# Patient Record
Sex: MALE | Race: WHITE | Employment: FULL TIME | URBAN - METROPOLITAN AREA
[De-identification: names, ages, dates, MRNs, and addresses within clinical notes are randomized per-mention and may not be internally consistent; named-entity substitution may affect disease eponyms.]

---

## 2017-04-27 RX ORDER — PANTOPRAZOLE SODIUM 40 MG/1
TABLET, DELAYED RELEASE ORAL
Qty: 90 TAB | Refills: 3 | Status: CANCELLED | OUTPATIENT
Start: 2017-04-27

## 2017-04-27 NOTE — TELEPHONE ENCOUNTER
Pt advised that he hasnt been seen since 2015.   Appt scheduled with Dr. Minda Chauhan on Monday 5-1-17 at 2:00pm.

## 2017-05-01 ENCOUNTER — OFFICE VISIT (OUTPATIENT)
Dept: FAMILY MEDICINE CLINIC | Age: 37
End: 2017-05-01

## 2017-05-01 VITALS
BODY MASS INDEX: 36.64 KG/M2 | DIASTOLIC BLOOD PRESSURE: 87 MMHG | OXYGEN SATURATION: 95 % | WEIGHT: 228 LBS | RESPIRATION RATE: 16 BRPM | TEMPERATURE: 98 F | HEART RATE: 102 BPM | SYSTOLIC BLOOD PRESSURE: 128 MMHG | HEIGHT: 66 IN

## 2017-05-01 DIAGNOSIS — Z23 ENCOUNTER FOR IMMUNIZATION: ICD-10-CM

## 2017-05-01 DIAGNOSIS — Z00.00 WELL ADULT EXAM: Primary | ICD-10-CM

## 2017-05-01 DIAGNOSIS — K21.00 GERD WITH ESOPHAGITIS: ICD-10-CM

## 2017-05-01 RX ORDER — PANTOPRAZOLE SODIUM 40 MG/1
40 TABLET, DELAYED RELEASE ORAL DAILY
Qty: 90 TAB | Refills: 3 | Status: SHIPPED | OUTPATIENT
Start: 2017-05-01 | End: 2018-05-04 | Stop reason: SDUPTHER

## 2017-05-01 RX ORDER — PANTOPRAZOLE SODIUM 40 MG/1
40 TABLET, DELAYED RELEASE ORAL DAILY
Qty: 90 TAB | Refills: 3 | Status: CANCELLED | OUTPATIENT
Start: 2017-05-01

## 2017-05-01 NOTE — PROGRESS NOTES
HPI  Donaldo Arrieta is a 43 y.o. male who presents to establish care with me. Last seen in 2015. He has no medical complaints other than a history of esophagitis diagnosed on EGD in 2014. Takes chronic PPI for this. Needs a refill on this today    Has gained some weight recently after he had a baby. Has not been going to the gym quite as much and fell off of his diet. He is working on calorie counting and try to get back into the gym. PMHx:  Past Medical History:   Diagnosis Date    GERD (gastroesophageal reflux disease)     Hypertension        Meds:   Current Outpatient Prescriptions   Medication Sig Dispense Refill    pantoprazole (PROTONIX) 40 mg tablet Take 1 Tab by mouth daily. 90 Tab 3       Allergies:   No Known Allergies    Smoker:  History   Smoking Status    Never Smoker   Smokeless Tobacco    Never Used       ETOH:   History   Alcohol use Not on file       FH: No family history on file. ROS:  As listed in HPI. In addition:  Constitutional:   No headache, fever, fatigue, weight loss or weight gain      Eyes:   No redness, pruritis, pain, visual changes, swelling, or discharge      Ears:    No pain, loss or changes in hearing     Cardiac:    No chest pain      Resp:   No cough or shortness of breath      Neuro   No loss of consciousness, dizziness, seizures      Physical Exam:  Blood pressure 128/87, pulse (!) 102, temperature 98 °F (36.7 °C), resp. rate 16, height 5' 6\" (1.676 m), weight 228 lb (103.4 kg), SpO2 95 %. GEN: No apparent distress. Alert and oriented and responds to all questions appropriately. EYES:  Conjunctiva clear; pupils round and reactive to light; extraocular movements are intact. EAR: External ears are normal.  Tympanic membranes are clear and without effusion. NOSE: Turbinates are within normal limits. No drainage  OROPHYARYNX: No oral lesions or exudates.   NECK:  Supple; no masses; thyroid normal           LUNGS: Respirations unlabored; clear to auscultation bilaterally  CARDIOVASCULAR: Regular rate and rhythm without murmurs   ABDOMEN: Soft; nontender; nondistended; normoactive bowel sounds; no masses or organomegaly  NEUROLOGIC:  No focal neurologic deficits. Strength and sensation grossly intact. Coordination and gait grossly intact. EXT: Well perfused. No edema. SKIN: No obvious rashes. Assessment and Plan     Well adult  Surveillance labs    GERD with esophagitis  Protonix    Tdap    Recommend annual follow-up, every 3 years at the most    Addendum: Labs reviewed. LDL 5 years ago was 200 and is 175 this year. Should probably consider statin    Prediabetes, A1c 5.8. Recommend six-month follow-up. Notify patient      ICD-10-CM ICD-9-CM    1. Well adult exam D56.31 C83.8 METABOLIC PANEL, COMPREHENSIVE      CBC WITH AUTOMATED DIFF      LIPID PANEL      HEMOGLOBIN A1C WITH EAG   2. GERD with esophagitis K21.0 530.11 pantoprazole (PROTONIX) 40 mg tablet   3. Encounter for immunization Z23 V03.89 TETANUS, DIPHTHERIA TOXOIDS AND ACELLULAR PERTUSSIS VACCINE (TDAP), IN INDIVIDS. >=7, IM      OK IMMUNIZ ADMIN,1 SINGLE/COMB VAC/TOXOID       AVS given.  Pt expressed understanding of instructions

## 2017-05-01 NOTE — TELEPHONE ENCOUNTER
Script for pantoprazole 40mg sent to [facility] pharmacy today per Dr. Yina Schmidt.

## 2017-05-01 NOTE — PATIENT INSTRUCTIONS

## 2017-05-01 NOTE — MR AVS SNAPSHOT
Visit Information Date & Time Provider Department Dept. Phone Encounter #  
 5/1/2017  2:00 PM Bhumi Alvarez MD [address] [facility] [phone] [id number] Upcoming Health Maintenance Date Due DTaP/Tdap/Td series (1 - Tdap) 8/4/1999 INFLUENZA AGE 9 TO ADULT 8/1/2017 Allergies as of 5/1/2017  Review Complete On: 5/1/2017 By: Renuka Cam No Known Allergies Current Immunizations  Never Reviewed Name Date Tdap  Incomplete Not reviewed this visit You Were Diagnosed With   
  
 Codes Comments Well adult exam    -  Primary ICD-10-CM: Z00.00 ICD-9-CM: V70.0 GERD with esophagitis     ICD-10-CM: K21.0 ICD-9-CM: 530.11 Encounter for immunization     ICD-10-CM: J29 ICD-9-CM: V03.89 Vitals BP Pulse Temp Resp Height(growth percentile) Weight(growth percentile) (!) 140/95 (BP 1 Location: Right arm, BP Patient Position: Sitting) (!) 102 98 °F (36.7 °C) 16 5' 6\" (1.676 m) 228 lb (103.4 kg) SpO2 BMI Smoking Status 95% 36.8 kg/m2 Never Smoker Vitals History BMI and BSA Data Body Mass Index Body Surface Area  
 36.8 kg/m 2 2.19 m 2 Preferred Pharmacy Pharmacy Name Phone [facility] [address] [place] [address] [phone] Your Updated Medication List  
  
   
This list is accurate as of: 5/1/17  2:36 PM.  Always use your most recent med list.  
  
  
  
  
 pantoprazole 40 mg tablet Commonly known as:  PROTONIX Take 1 Tab by mouth daily. Prescriptions Sent to Pharmacy Refills  
 pantoprazole (PROTONIX) 40 mg tablet 3 Sig: Take 1 Tab by mouth daily. Class: Normal  
 Pharmacy: [facility] Ph #: [phone] Route: Oral  
  
We Performed the Following CBC WITH AUTOMATED DIFF [96313 CPT(R)] HEMOGLOBIN A1C WITH EAG [79092 CPT(R)] LIPID PANEL [52939 CPT(R)] METABOLIC PANEL, COMPREHENSIVE [79712 CPT(R)] AK IMMUNIZ ADMIN,1 SINGLE/COMB VAC/TOXOID [id number] CPT(R)] TETANUS, DIPHTHERIA TOXOIDS AND ACELLULAR PERTUSSIS VACCINE (TDAP), IN INDIVIDS. >=7, IM [id number] CPT(R)] Patient Instructions Learning About Physical Activity What is physical activity? Physical activity is any kind of activity that gets your body moving. The types of physical activity that can help you get fit and stay healthy include: · Aerobic or \"cardio\" activities that make your heart beat faster and make you breathe harder, such as brisk walking, riding a bike, or running. Aerobic activities strengthen your heart and lungs and build up your endurance. · Strength training activities that make your muscles work against, or \"resist,\" something, such as lifting weights or doing push-ups. These activities help tone and strengthen your muscles. · Stretches that allow you to move your joints and muscles through their full range of motion. Stretching helps you be more flexible and avoid injury. What are the benefits of physical activity? Being active is one of the best things you can do to get fit and stay healthy. It helps you to: · Feel stronger and have more energy to do all the things you like to do. · Focus better at school or work and perform better in sports. · Feel, think, and sleep better. · Reach and stay at a healthy weight. · Lose fat and build lean muscle. · Lower your risk for serious health problems. · Keep your bones, muscles, and joints strong. Being fit lets you do more physical activity. And it lets you work out harder without as much effort. How can you make physical activity part of your life? Get at least 30 minutes of exercise on most days of the week. Walking is a good choice. You also may want to do other activities, such as running, swimming, cycling, or playing tennis or team sports. Pick activities that you likeones that make your heart beat faster, your muscles stronger, and your muscles and joints more flexible. If you find more than one thing you like doing, do them all. You don't have to do the same thing every day. Get your heart pumping every day. Any activity that makes your heart beat faster and keeps it at that rate for a while counts. Here are some great ways to get your heart beating faster: · Go for a brisk walk, run, or bike ride. · Go for a hike or swim. · Go in-line skating. · Play a game of touch football, basketball, or soccer. · Ride a bike. · Play tennis or racquetball. · Climb stairs. Even some household chores can be aerobicjust do them at a faster pace. Vacuuming, raking or mowing the lawn, sweeping the garage, and washing and waxing the car all can help get your heart rate up. Strengthen your muscles during the week. You don't have to lift heavy weights or grow big, bulky muscles to get stronger. Doing a few simple activities that make your muscles work against, or \"resist,\" something can help you get stronger. For example, you can: · Do push-ups or sit-ups, which use your own body weight as resistance. · Lift weights or dumbbells or use stretch bands at home or in a gym or community center. Stretch your muscles often. Stretching will help you as you become more active. It can help you stay flexible, loosen tight muscles, and avoid injury. It can also help improve your balance and posture and can be a great way to relax. Be sure to stretch the muscles you'll be using when you work out. It's best to warm your muscles slightly before you stretch them. Walk or do some other light aerobic activity for a few minutes, and then start stretching. When you stretch your muscles: · Do it slowly. Stretching is not about going fast or making sudden movements. · Don't push or bounce during a stretch. · Hold each stretch for at least 15 to 30 seconds, if you can. You should feel a stretch in the muscle, but not pain. · Breathe out as you do the stretch. Then breathe in as you hold the stretch. Don't hold your breath. If you're worried about how more activity might affect your health, have a checkup before you start. Follow any special advice your doctor gives you for getting a smart start. Where can you learn more? Go to http://gregoria-linda.info/. Enter F937 in the search box to learn more about \"Learning About Physical Activity. \" Current as of: May 27, 2016 Content Version: 11.2 © 5789-8218 [employer]. Care instructions adapted under license by [employer] (which disclaims liability or warranty for this information). If you have questions about a medical condition or this instruction, always ask your healthcare professional. [address] any warranty or liability for your use of this information. Introducing [facility]! Toribio Nyhan introduces [employer] patient portal. Now you can access parts of your medical record, email your doctor's office, and request medication refills online. 1. In your internet browser, go to [email or web address] 2. Click on the First Time User? Click Here link in the Sign In box. You will see the New Member Sign Up page. 3. Enter your [employer] Access Code exactly as it appears below. You will not need to use this code after youve completed the sign-up process. If you do not sign up before the expiration date, you must request a new code. · [employer] Access Code: JENNIFFER HORTON [facility] Expires: 7/30/2017  2:08 PM 
 
4. Enter the last four digits of your Social Security Number (xxxx) and Date of Birth (mm/dd/yyyy) as indicated and click Submit. You will be taken to the next sign-up page. 5. Create a KeyEffx ID.  This will be your [employer] login ID and cannot be changed, so think of one that is secure and easy to remember. 6. Create a [employer] password. You can change your password at any time. 7. Enter your Password Reset Question and Answer. This can be used at a later time if you forget your password. 8. Enter your e-mail address. You will receive e-mail notification when new information is available in [address] 9. Click Sign Up. You can now view and download portions of your medical record. 10. Click the Download Summary menu link to download a portable copy of your medical information. If you have questions, please visit the Frequently Asked Questions section of the [employer] website. Remember, [employer] is NOT to be used for urgent needs. For medical emergencies, dial 911. Now available from your iPhone and Android! Please provide this summary of care documentation to your next provider. Your primary care clinician is listed as [address] If you have any questions after today's visit, please call [phone].

## 2017-05-02 ENCOUNTER — TELEPHONE (OUTPATIENT)
Dept: FAMILY MEDICINE CLINIC | Age: 37
End: 2017-05-02

## 2017-05-02 PROBLEM — E78.00 PURE HYPERCHOLESTEROLEMIA: Status: ACTIVE | Noted: 2017-05-02

## 2017-05-02 PROBLEM — R73.03 PREDIABETES: Status: ACTIVE | Noted: 2017-05-02

## 2017-05-02 LAB
ALBUMIN SERPL-MCNC: 4.5 G/DL (ref 3.5–5.5)
ALBUMIN/GLOB SERPL: 1.6 {RATIO} (ref 1.2–2.2)
ALP SERPL-CCNC: 69 IU/L (ref 39–117)
ALT SERPL-CCNC: 39 IU/L (ref 0–44)
AST SERPL-CCNC: 23 IU/L (ref 0–40)
BASOPHILS # BLD AUTO: 0 X10E3/UL (ref 0–0.2)
BASOPHILS NFR BLD AUTO: 0 %
BILIRUB SERPL-MCNC: 0.3 MG/DL (ref 0–1.2)
BUN SERPL-MCNC: 11 MG/DL (ref 6–20)
BUN/CREAT SERPL: 12 (ref 9–20)
CALCIUM SERPL-MCNC: 9.6 MG/DL (ref 8.7–10.2)
CHLORIDE SERPL-SCNC: 100 MMOL/L (ref 96–106)
CHOLEST SERPL-MCNC: 270 MG/DL (ref 100–199)
CO2 SERPL-SCNC: 22 MMOL/L (ref 18–29)
CREAT SERPL-MCNC: 0.93 MG/DL (ref 0.76–1.27)
EOSINOPHIL # BLD AUTO: 0.1 X10E3/UL (ref 0–0.4)
EOSINOPHIL NFR BLD AUTO: 2 %
ERYTHROCYTE [DISTWIDTH] IN BLOOD BY AUTOMATED COUNT: 13.6 % (ref 12.3–15.4)
EST. AVERAGE GLUCOSE BLD GHB EST-MCNC: 120 MG/DL
GLOBULIN SER CALC-MCNC: 2.8 G/DL (ref 1.5–4.5)
GLUCOSE SERPL-MCNC: 179 MG/DL (ref 65–99)
HBA1C MFR BLD: 5.8 % (ref 4.8–5.6)
HCT VFR BLD AUTO: 43.2 % (ref 37.5–51)
HDLC SERPL-MCNC: 50 MG/DL
HGB BLD-MCNC: 14.6 G/DL (ref 12.6–17.7)
IMM GRANULOCYTES # BLD: 0 X10E3/UL (ref 0–0.1)
IMM GRANULOCYTES NFR BLD: 0 %
INTERPRETATION, 910389: NORMAL
LDLC SERPL CALC-MCNC: 173 MG/DL (ref 0–99)
LYMPHOCYTES # BLD AUTO: 2 X10E3/UL (ref 0.7–3.1)
LYMPHOCYTES NFR BLD AUTO: 34 %
MCH RBC QN AUTO: 30.5 PG (ref 26.6–33)
MCHC RBC AUTO-ENTMCNC: 33.8 G/DL (ref 31.5–35.7)
MCV RBC AUTO: 90 FL (ref 79–97)
MONOCYTES # BLD AUTO: 0.4 X10E3/UL (ref 0.1–0.9)
MONOCYTES NFR BLD AUTO: 6 %
NEUTROPHILS # BLD AUTO: 3.6 X10E3/UL (ref 1.4–7)
NEUTROPHILS NFR BLD AUTO: 58 %
PLATELET # BLD AUTO: 271 X10E3/UL (ref 150–379)
POTASSIUM SERPL-SCNC: 3.7 MMOL/L (ref 3.5–5.2)
PROT SERPL-MCNC: 7.3 G/DL (ref 6–8.5)
RBC # BLD AUTO: 4.78 X10E6/UL (ref 4.14–5.8)
SODIUM SERPL-SCNC: 142 MMOL/L (ref 134–144)
TRIGL SERPL-MCNC: 234 MG/DL (ref 0–149)
VLDLC SERPL CALC-MCNC: 47 MG/DL (ref 5–40)
WBC # BLD AUTO: 6.1 X10E3/UL (ref 3.4–10.8)

## 2017-05-02 NOTE — TELEPHONE ENCOUNTER
Labs reviewed, 2 things: Your cholesterol is high. You are pretty young but we may need to consider starting a cholesterol medicine. Your hemoglobin A1c is high. You are \"pre-diabetic. \" You do not have diabetes but you will develop diabetes in the next few years if you do not make some changes. Change your diet, focus on vegetables and lean meats. Avoid pasta, white bread and potatoes. Do not drink soda. We can refer you to a dietician if you would like to talk with someone about your diet. Make these changes and come back in 6 months to recheck your blood sugar to make sure you are going in the right direction.

## 2017-05-03 NOTE — TELEPHONE ENCOUNTER
Mr. Meg Nunez notified Dr. Ramon Yin said Labs reviewed, 2 things: Your cholesterol is high. You are pretty young but we may need to consider starting a cholesterol medicine.     Your hemoglobin A1c is high. You are \"pre-diabetic. \" You do not have diabetes but you will develop diabetes in the next few years if you do not make some changes. Change your diet, focus on vegetables and lean meats. Avoid pasta, white bread and potatoes. Do not drink soda. We can refer you to a dietician if you would like to talk with someone about your diet. Make these changes and come back in 6 months to recheck your blood sugar to make sure you are going in the right direction.   he voiced understanding and wants to wait on cholesterol med and work on diet.  Apt set up for 8/17

## 2018-05-04 DIAGNOSIS — K21.00 GERD WITH ESOPHAGITIS: ICD-10-CM

## 2018-05-04 RX ORDER — PANTOPRAZOLE SODIUM 40 MG/1
40 TABLET, DELAYED RELEASE ORAL DAILY
Qty: 90 TAB | Refills: 3 | Status: SHIPPED | OUTPATIENT
Start: 2018-05-04 | End: 2019-04-16 | Stop reason: SDUPTHER

## 2018-05-04 NOTE — TELEPHONE ENCOUNTER
Pt is calling requesting a refill on his med [facility] is the new pharmacy and I changed to it    Requested Prescriptions     Pending Prescriptions Disp Refills    pantoprazole (PROTONIX) 40 mg tablet 90 Tab 3     Sig: Take 1 Tab by mouth daily.

## 2019-04-16 DIAGNOSIS — K21.00 GERD WITH ESOPHAGITIS: ICD-10-CM

## 2019-04-16 RX ORDER — PANTOPRAZOLE SODIUM 40 MG/1
TABLET, DELAYED RELEASE ORAL
Qty: 90 TAB | Refills: 3 | Status: SHIPPED | OUTPATIENT
Start: 2019-04-16 | End: 2020-04-07 | Stop reason: SDUPTHER

## 2020-04-06 DIAGNOSIS — K21.00 GERD WITH ESOPHAGITIS: ICD-10-CM

## 2020-04-06 RX ORDER — PANTOPRAZOLE SODIUM 40 MG/1
TABLET, DELAYED RELEASE ORAL
Qty: 30 TAB | Refills: 1 | OUTPATIENT
Start: 2020-04-06

## 2020-04-06 NOTE — TELEPHONE ENCOUNTER
Last seen 5/2017. Can refill a limited supply but needs follow-up before further refills.   Virtual visit would be fine

## 2020-04-07 ENCOUNTER — VIRTUAL VISIT (OUTPATIENT)
Dept: FAMILY MEDICINE CLINIC | Age: 40
End: 2020-04-07

## 2020-04-07 VITALS — BODY MASS INDEX: 38.57 KG/M2 | HEIGHT: 66 IN | WEIGHT: 240 LBS

## 2020-04-07 DIAGNOSIS — E66.09 CLASS 2 OBESITY DUE TO EXCESS CALORIES WITHOUT SERIOUS COMORBIDITY WITH BODY MASS INDEX (BMI) OF 38.0 TO 38.9 IN ADULT: ICD-10-CM

## 2020-04-07 DIAGNOSIS — E78.00 PURE HYPERCHOLESTEROLEMIA: ICD-10-CM

## 2020-04-07 DIAGNOSIS — R73.03 PREDIABETES: Primary | ICD-10-CM

## 2020-04-07 DIAGNOSIS — K21.00 GERD WITH ESOPHAGITIS: ICD-10-CM

## 2020-04-07 RX ORDER — PANTOPRAZOLE SODIUM 40 MG/1
TABLET, DELAYED RELEASE ORAL
Qty: 90 TAB | Refills: 3 | Status: SHIPPED | OUTPATIENT
Start: 2020-04-07 | End: 2021-04-05

## 2020-04-07 NOTE — PROGRESS NOTES
Chief Complaint   Patient presents with    Medication Refill     follow-up     Health Maintenance reviewed     1. Have you been to the ER, urgent care clinic since your last visit? Hospitalized since your last visit? no    2. Have you seen or consulted any other health care providers outside of the [address] [place] [address] since your last visit? Include any pap smears or colon screening.   No

## 2020-04-07 NOTE — PROGRESS NOTES
HPI  Jennifer Lowe is a 37 y.o. male who presents to follow-up on chronic medical issues. No complaints other than esophagitis diagnosed on EGD 2014. Takes chronic PPI for this, needs a refill today. He has gained 20 pounds since I last saw him 3 years ago. Attributes this to a job change, doing a lot of travel and eating on the road. He has been trying to go to the gym and is thinking about getting a bike to drive around the neighborhood. When last seen he was found to have an LDL of 175 down from 200. Also found to have prediabetes A1c 5.8. We had called him with those results. He wanted to wait on cholesterol medicine. I had recommended 6-month follow-up, he missed that appointment. Reviewed those labs with him today. He did not recall these being a problem. He has a family history of early heart disease. Dad had a \"minor heart attack\" in his 45s. Mother also has heart disease but he is not sure about the age    PMHx:  Past Medical History:   Diagnosis Date    GERD (gastroesophageal reflux disease)     Hypertension        Meds:   Current Outpatient Medications   Medication Sig Dispense Refill    pantoprazole (PROTONIX) 40 mg tablet TAKE 1 TABLET BY MOUTH EVERY DAY 90 Tab 3       Allergies:   No Known Allergies    Smoker:  Social History     Tobacco Use   Smoking Status Never Smoker   Smokeless Tobacco Never Used       ETOH:   Social History     Substance and Sexual Activity   Alcohol Use None       FH: No family history on file. ROS:   As listed in HPI. In addition:  Constitutional:   No headache, fever, fatigue, weight loss or weight gain      Cardiac:    No chest pain      Resp:   No cough or shortness of breath      Neuro   No loss of consciousness, dizziness, seizures      Physical Exam:  Height 5' 6\" (1.676 m), weight 240 lb (108.9 kg). GEN: No apparent distress. Alert and oriented and responds to all questions appropriately. NEUROLOGIC:  No focal neurologic deficits.  Strength and sensation grossly intact. Coordination and gait grossly intact. EXT: Well perfused. No edema. SKIN: No obvious rashes. Assessment and Plan     GERD with esophagitis  Refill Protonix    Hyperlipidemia, possible familial hyperlipidemia  Father history early heart disease in his 45s  LDL was 175 down from 200  Offered statin to reduce risk of heart disease. Discussed wisdom of doing this sooner rather than later to protect body over time  He would like to wait on statin until blood work done in future    Prediabetes  A1c was 5.8 when last checked 3 years ago  Has gained 20 pounds since then  Several starches in his diet that he has been thinking about cutting back on    Gout? Was having flares in his ankles once every few years. Had 2 flares in the last year. No particular dietary issue he is noticed. Thinks dehydration from working in the yard may be contributing  Recalls being on indomethacin but did not feel it was too useful. Flares last about 3 days and he does not feel the need for medicine. Obese  20 pound weight gain in the last few years  Eating out. Briefly counseled on diet. Recommended focusing on cutting down on carbs    Offered labs to be sent to [facility], he would like to follow-up when pandemic has  down and do blood work at that time    Plan for CBC, CMP, lipid, TSH, A1c, uric acid        ICD-10-CM ICD-9-CM    1. Prediabetes R73.03 790.29    2. GERD with esophagitis K21.0 530.11 pantoprazole (PROTONIX) 40 mg tablet   3. Pure hypercholesterolemia E78.00 272.0    4. Class 2 obesity due to excess calories without serious comorbidity with body mass index (BMI) of 38.0 to 38.9 in adult E66.09 278.00     Z68.38 V85.38        AVS given. Pt expressed understanding of instructions    THIS VISIT WAS COMPLETED VIRTUALLY USING DOXY. ME    Due to this being a TeleHealth evaluation, many elements of the physical examination are unable to be assessed.      Pursuant to the emergency declaration under the [facility], [facility] waiver authority and the [employer] and [employer] Act, this Virtual  Visit was conducted, with patient's consent, to reduce the patient's risk of exposure to COVID-19 and provide continuity of care for an established patient. Services were provided through a video synchronous discussion virtually to substitute for in-person clinic visit.

## 2021-08-30 ENCOUNTER — APPOINTMENT (OUTPATIENT)
Dept: CT IMAGING | Age: 41
DRG: 149 | End: 2021-08-30
Attending: EMERGENCY MEDICINE
Payer: COMMERCIAL

## 2021-08-30 ENCOUNTER — HOSPITAL ENCOUNTER (INPATIENT)
Age: 41
LOS: 2 days | Discharge: HOME | DRG: 149 | End: 2021-09-01
Attending: EMERGENCY MEDICINE | Admitting: INTERNAL MEDICINE
Payer: COMMERCIAL

## 2021-08-30 DIAGNOSIS — R03.0 ELEVATED BLOOD PRESSURE READING: ICD-10-CM

## 2021-08-30 DIAGNOSIS — R42 VERTIGO: ICD-10-CM

## 2021-08-30 DIAGNOSIS — E66.09 OBESITY DUE TO EXCESS CALORIES WITH SERIOUS COMORBIDITY, UNSPECIFIED CLASSIFICATION: ICD-10-CM

## 2021-08-30 DIAGNOSIS — R11.2 NAUSEA AND VOMITING, INTRACTABILITY OF VOMITING NOT SPECIFIED, UNSPECIFIED VOMITING TYPE: ICD-10-CM

## 2021-08-30 DIAGNOSIS — E78.2 MIXED HYPERLIPIDEMIA: ICD-10-CM

## 2021-08-30 DIAGNOSIS — I63.50 POSTERIOR CIRCULATION STROKE (HCC): Primary | ICD-10-CM

## 2021-08-30 PROBLEM — I63.9 STROKE (HCC): Status: ACTIVE | Noted: 2021-08-30

## 2021-08-30 LAB
ALBUMIN SERPL-MCNC: 3.5 G/DL (ref 3.5–5)
ALBUMIN/GLOB SERPL: 0.7 {RATIO} (ref 1.1–2.2)
ALP SERPL-CCNC: 79 U/L (ref 45–117)
ALT SERPL-CCNC: 78 U/L (ref 12–78)
ANION GAP SERPL CALC-SCNC: 9 MMOL/L (ref 5–15)
AST SERPL-CCNC: 50 U/L (ref 15–37)
BASOPHILS # BLD: 0.1 K/UL (ref 0–0.1)
BASOPHILS NFR BLD: 1 % (ref 0–1)
BILIRUB SERPL-MCNC: 0.3 MG/DL (ref 0.2–1)
BUN SERPL-MCNC: 15 MG/DL (ref 6–20)
BUN/CREAT SERPL: 14 (ref 12–20)
CALCIUM SERPL-MCNC: 8.9 MG/DL (ref 8.5–10.1)
CHLORIDE SERPL-SCNC: 103 MMOL/L (ref 97–108)
CO2 SERPL-SCNC: 27 MMOL/L (ref 21–32)
COVID-19 RAPID TEST, COVR: NOT DETECTED
CREAT SERPL-MCNC: 1.1 MG/DL (ref 0.7–1.3)
DIFFERENTIAL METHOD BLD: ABNORMAL
EOSINOPHIL # BLD: 0.2 K/UL (ref 0–0.4)
EOSINOPHIL NFR BLD: 1 % (ref 0–7)
ERYTHROCYTE [DISTWIDTH] IN BLOOD BY AUTOMATED COUNT: 13.2 % (ref 11.5–14.5)
GLOBULIN SER CALC-MCNC: 5 G/DL (ref 2–4)
GLUCOSE BLD STRIP.AUTO-MCNC: 132 MG/DL (ref 65–117)
GLUCOSE SERPL-MCNC: 153 MG/DL (ref 65–100)
HCT VFR BLD AUTO: 44.2 % (ref 36.6–50.3)
HGB BLD-MCNC: 14.6 G/DL (ref 12.1–17)
IMM GRANULOCYTES # BLD AUTO: 0.1 K/UL (ref 0–0.04)
IMM GRANULOCYTES NFR BLD AUTO: 1 % (ref 0–0.5)
LIPASE SERPL-CCNC: 80 U/L (ref 73–393)
LYMPHOCYTES # BLD: 2.5 K/UL (ref 0.8–3.5)
LYMPHOCYTES NFR BLD: 20 % (ref 12–49)
MCH RBC QN AUTO: 31.1 PG (ref 26–34)
MCHC RBC AUTO-ENTMCNC: 33 G/DL (ref 30–36.5)
MCV RBC AUTO: 94.2 FL (ref 80–99)
MONOCYTES # BLD: 0.8 K/UL (ref 0–1)
MONOCYTES NFR BLD: 6 % (ref 5–13)
NEUTS SEG # BLD: 9.2 K/UL (ref 1.8–8)
NEUTS SEG NFR BLD: 71 % (ref 32–75)
NRBC # BLD: 0 K/UL (ref 0–0.01)
NRBC BLD-RTO: 0 PER 100 WBC
PLATELET # BLD AUTO: 287 K/UL (ref 150–400)
PMV BLD AUTO: 10.4 FL (ref 8.9–12.9)
POTASSIUM SERPL-SCNC: 3.5 MMOL/L (ref 3.5–5.1)
PROT SERPL-MCNC: 8.5 G/DL (ref 6.4–8.2)
RBC # BLD AUTO: 4.69 M/UL (ref 4.1–5.7)
SERVICE CMNT-IMP: ABNORMAL
SODIUM SERPL-SCNC: 139 MMOL/L (ref 136–145)
SOURCE, COVRS: NORMAL
TROPONIN I SERPL-MCNC: <0.05 NG/ML
WBC # BLD AUTO: 12.8 K/UL (ref 4.1–11.1)

## 2021-08-30 PROCEDURE — 84484 ASSAY OF TROPONIN QUANT: CPT

## 2021-08-30 PROCEDURE — 70450 CT HEAD/BRAIN W/O DYE: CPT

## 2021-08-30 PROCEDURE — 36415 COLL VENOUS BLD VENIPUNCTURE: CPT

## 2021-08-30 PROCEDURE — 93005 ELECTROCARDIOGRAM TRACING: CPT

## 2021-08-30 PROCEDURE — 74011000250 HC RX REV CODE- 250: Performed by: EMERGENCY MEDICINE

## 2021-08-30 PROCEDURE — 0042T CT CODE NEURO PERF W CBF: CPT

## 2021-08-30 PROCEDURE — 3E03317 INTRODUCTION OF OTHER THROMBOLYTIC INTO PERIPHERAL VEIN, PERCUTANEOUS APPROACH: ICD-10-PCS | Performed by: EMERGENCY MEDICINE

## 2021-08-30 PROCEDURE — 74011000250 HC RX REV CODE- 250: Performed by: NURSE PRACTITIONER

## 2021-08-30 PROCEDURE — 74011000636 HC RX REV CODE- 636: Performed by: EMERGENCY MEDICINE

## 2021-08-30 PROCEDURE — 65610000006 HC RM INTENSIVE CARE

## 2021-08-30 PROCEDURE — 83690 ASSAY OF LIPASE: CPT

## 2021-08-30 PROCEDURE — 37195 THROMBOLYTIC THERAPY STROKE: CPT

## 2021-08-30 PROCEDURE — 85025 COMPLETE CBC W/AUTO DIFF WBC: CPT

## 2021-08-30 PROCEDURE — 80053 COMPREHEN METABOLIC PANEL: CPT

## 2021-08-30 PROCEDURE — 82962 GLUCOSE BLOOD TEST: CPT

## 2021-08-30 PROCEDURE — 70496 CT ANGIOGRAPHY HEAD: CPT

## 2021-08-30 PROCEDURE — 74011000258 HC RX REV CODE- 258: Performed by: NURSE PRACTITIONER

## 2021-08-30 PROCEDURE — 87635 SARS-COV-2 COVID-19 AMP PRB: CPT

## 2021-08-30 PROCEDURE — 99285 EMERGENCY DEPT VISIT HI MDM: CPT

## 2021-08-30 PROCEDURE — 74011250636 HC RX REV CODE- 250/636

## 2021-08-30 PROCEDURE — 74011000258 HC RX REV CODE- 258: Performed by: EMERGENCY MEDICINE

## 2021-08-30 RX ORDER — SODIUM CHLORIDE 9 MG/ML
50 INJECTION, SOLUTION INTRAVENOUS ONCE
Status: DISPENSED | OUTPATIENT
Start: 2021-08-30 | End: 2021-08-31

## 2021-08-30 RX ORDER — SODIUM CHLORIDE 0.9 % (FLUSH) 0.9 %
5-40 SYRINGE (ML) INJECTION EVERY 8 HOURS
Status: DISCONTINUED | OUTPATIENT
Start: 2021-08-30 | End: 2021-09-01 | Stop reason: HOSPADM

## 2021-08-30 RX ORDER — MAGNESIUM SULFATE 100 %
4 CRYSTALS MISCELLANEOUS AS NEEDED
Status: DISCONTINUED | OUTPATIENT
Start: 2021-08-30 | End: 2021-09-01 | Stop reason: HOSPADM

## 2021-08-30 RX ORDER — ACETAMINOPHEN 650 MG/1
650 SUPPOSITORY RECTAL
Status: DISCONTINUED | OUTPATIENT
Start: 2021-08-30 | End: 2021-09-01 | Stop reason: HOSPADM

## 2021-08-30 RX ORDER — SODIUM CHLORIDE 0.9 % (FLUSH) 0.9 %
5-40 SYRINGE (ML) INJECTION AS NEEDED
Status: DISCONTINUED | OUTPATIENT
Start: 2021-08-30 | End: 2021-09-01 | Stop reason: HOSPADM

## 2021-08-30 RX ORDER — INSULIN LISPRO 100 [IU]/ML
INJECTION, SOLUTION INTRAVENOUS; SUBCUTANEOUS EVERY 6 HOURS
Status: DISCONTINUED | OUTPATIENT
Start: 2021-08-31 | End: 2021-08-31

## 2021-08-30 RX ORDER — PANTOPRAZOLE SODIUM 40 MG/1
40 TABLET, DELAYED RELEASE ORAL
Status: DISCONTINUED | OUTPATIENT
Start: 2021-08-31 | End: 2021-09-01 | Stop reason: HOSPADM

## 2021-08-30 RX ORDER — ONDANSETRON 4 MG/1
4 TABLET, ORALLY DISINTEGRATING ORAL
Status: DISCONTINUED | OUTPATIENT
Start: 2021-08-30 | End: 2021-09-01 | Stop reason: HOSPADM

## 2021-08-30 RX ORDER — DEXTROSE 50 % IN WATER (D50W) INTRAVENOUS SYRINGE
12.5-25 AS NEEDED
Status: DISCONTINUED | OUTPATIENT
Start: 2021-08-30 | End: 2021-09-01 | Stop reason: HOSPADM

## 2021-08-30 RX ORDER — POLYETHYLENE GLYCOL 3350 17 G/17G
17 POWDER, FOR SOLUTION ORAL DAILY PRN
Status: DISCONTINUED | OUTPATIENT
Start: 2021-08-30 | End: 2021-09-01 | Stop reason: HOSPADM

## 2021-08-30 RX ORDER — ACETAMINOPHEN 325 MG/1
650 TABLET ORAL
Status: DISCONTINUED | OUTPATIENT
Start: 2021-08-30 | End: 2021-09-01 | Stop reason: HOSPADM

## 2021-08-30 RX ORDER — SODIUM CHLORIDE 9 MG/ML
50 INJECTION, SOLUTION INTRAVENOUS ONCE
Status: COMPLETED | OUTPATIENT
Start: 2021-08-30 | End: 2021-08-31

## 2021-08-30 RX ORDER — ONDANSETRON 2 MG/ML
4 INJECTION INTRAMUSCULAR; INTRAVENOUS
Status: DISCONTINUED | OUTPATIENT
Start: 2021-08-30 | End: 2021-09-01 | Stop reason: HOSPADM

## 2021-08-30 RX ORDER — LABETALOL HYDROCHLORIDE 5 MG/ML
10 INJECTION, SOLUTION INTRAVENOUS
Status: ACTIVE | OUTPATIENT
Start: 2021-08-30 | End: 2021-08-31

## 2021-08-30 RX ADMIN — IOPAMIDOL 100 ML: 755 INJECTION, SOLUTION INTRAVENOUS at 21:29

## 2021-08-30 RX ADMIN — ALTEPLASE 9 MG: KIT at 21:10

## 2021-08-30 RX ADMIN — ALTEPLASE 81 MG: KIT at 21:11

## 2021-08-30 RX ADMIN — SODIUM CHLORIDE 50 ML: 9 INJECTION, SOLUTION INTRAVENOUS at 22:10

## 2021-08-31 ENCOUNTER — APPOINTMENT (OUTPATIENT)
Dept: MRI IMAGING | Age: 41
DRG: 149 | End: 2021-08-31
Attending: PSYCHIATRY & NEUROLOGY
Payer: COMMERCIAL

## 2021-08-31 ENCOUNTER — APPOINTMENT (OUTPATIENT)
Dept: NON INVASIVE DIAGNOSTICS | Age: 41
DRG: 149 | End: 2021-08-31
Attending: NURSE PRACTITIONER
Payer: COMMERCIAL

## 2021-08-31 LAB
ANION GAP SERPL CALC-SCNC: 7 MMOL/L (ref 5–15)
ATRIAL RATE: 69 BPM
BUN SERPL-MCNC: 12 MG/DL (ref 6–20)
BUN/CREAT SERPL: 13 (ref 12–20)
CALCIUM SERPL-MCNC: 9 MG/DL (ref 8.5–10.1)
CALCULATED P AXIS, ECG09: 19 DEGREES
CALCULATED R AXIS, ECG10: -10 DEGREES
CALCULATED T AXIS, ECG11: 3 DEGREES
CHLORIDE SERPL-SCNC: 104 MMOL/L (ref 97–108)
CHOLEST SERPL-MCNC: 262 MG/DL
CO2 SERPL-SCNC: 27 MMOL/L (ref 21–32)
CREAT SERPL-MCNC: 0.93 MG/DL (ref 0.7–1.3)
DIAGNOSIS, 93000: NORMAL
ECHO AO ROOT DIAM: 3.41 CM
ECHO AV AREA PEAK VELOCITY: 3.03 CM2
ECHO AV AREA/BSA PEAK VELOCITY: 1.4 CM2/M2
ECHO AV MEAN GRADIENT: 2.97 MMHG
ECHO AV PEAK GRADIENT: 4.86 MMHG
ECHO AV PEAK VELOCITY: 108.72 CM/S
ECHO AV VTI: 18.03 CM
ECHO LA MAJOR AXIS: 3.74 CM
ECHO LA MINOR AXIS: 1.7 CM
ECHO LV E' LATERAL VELOCITY: 9.56 CM/S
ECHO LV E' SEPTAL VELOCITY: 4.52 CM/S
ECHO LV INTERNAL DIMENSION DIASTOLIC: 4.4 CM (ref 4.2–5.9)
ECHO LV INTERNAL DIMENSION SYSTOLIC: 2.88 CM
ECHO LV IVSD: 1.29 CM (ref 0.6–1)
ECHO LV MASS 2D: 213.9 G (ref 88–224)
ECHO LV MASS INDEX 2D: 97.2 G/M2 (ref 49–115)
ECHO LV POSTERIOR WALL DIASTOLIC: 1.3 CM (ref 0.6–1)
ECHO LVOT DIAM: 2.27 CM
ECHO LVOT PEAK GRADIENT: 2.65 MMHG
ECHO LVOT PEAK VELOCITY: 81.36 CM/S
ECHO MV A VELOCITY: 51.07 CM/S
ECHO MV AREA PHT: 5.03 CM2
ECHO MV E DECELERATION TIME (DT): 137.54 MS
ECHO MV E VELOCITY: 66.19 CM/S
ECHO MV E/A RATIO: 1.3
ECHO MV E/E' LATERAL: 6.92
ECHO MV E/E' RATIO (AVERAGED): 10.78
ECHO MV E/E' SEPTAL: 14.64
ECHO MV MAX VELOCITY: 88.15 CM/S
ECHO MV MEAN GRADIENT: 1.32 MMHG
ECHO MV PEAK GRADIENT: 3.11 MMHG
ECHO MV PRESSURE HALF TIME (PHT): 43.72 MS
ECHO MV VTI: 18.3 CM
ECHO PV MAX VELOCITY: 108.76 CM/S
ECHO PV PEAK INSTANTANEOUS GRADIENT SYSTOLIC: 4.73 MMHG
ERYTHROCYTE [DISTWIDTH] IN BLOOD BY AUTOMATED COUNT: 13.2 % (ref 11.5–14.5)
EST. AVERAGE GLUCOSE BLD GHB EST-MCNC: 120 MG/DL
GLUCOSE BLD STRIP.AUTO-MCNC: 107 MG/DL (ref 65–117)
GLUCOSE SERPL-MCNC: 111 MG/DL (ref 65–100)
HBA1C MFR BLD: 5.8 % (ref 4–5.6)
HCT VFR BLD AUTO: 42.8 % (ref 36.6–50.3)
HDLC SERPL-MCNC: 57 MG/DL
HDLC SERPL: 4.6 {RATIO} (ref 0–5)
HGB BLD-MCNC: 14.2 G/DL (ref 12.1–17)
LDLC SERPL CALC-MCNC: 189.8 MG/DL (ref 0–100)
MAGNESIUM SERPL-MCNC: 2.3 MG/DL (ref 1.6–2.4)
MCH RBC QN AUTO: 31.7 PG (ref 26–34)
MCHC RBC AUTO-ENTMCNC: 33.2 G/DL (ref 30–36.5)
MCV RBC AUTO: 95.5 FL (ref 80–99)
NRBC # BLD: 0 K/UL (ref 0–0.01)
NRBC BLD-RTO: 0 PER 100 WBC
P-R INTERVAL, ECG05: 146 MS
PHOSPHATE SERPL-MCNC: 4.4 MG/DL (ref 2.6–4.7)
PLATELET # BLD AUTO: 278 K/UL (ref 150–400)
PMV BLD AUTO: 10.4 FL (ref 8.9–12.9)
POTASSIUM SERPL-SCNC: 4.1 MMOL/L (ref 3.5–5.1)
Q-T INTERVAL, ECG07: 432 MS
QRS DURATION, ECG06: 92 MS
QTC CALCULATION (BEZET), ECG08: 462 MS
RBC # BLD AUTO: 4.48 M/UL (ref 4.1–5.7)
SERVICE CMNT-IMP: NORMAL
SODIUM SERPL-SCNC: 138 MMOL/L (ref 136–145)
TRIGL SERPL-MCNC: 76 MG/DL (ref ?–150)
VENTRICULAR RATE, ECG03: 69 BPM
VLDLC SERPL CALC-MCNC: 15.2 MG/DL
WBC # BLD AUTO: 10.9 K/UL (ref 4.1–11.1)

## 2021-08-31 PROCEDURE — 97112 NEUROMUSCULAR REEDUCATION: CPT

## 2021-08-31 PROCEDURE — 99222 1ST HOSP IP/OBS MODERATE 55: CPT | Performed by: PSYCHIATRY & NEUROLOGY

## 2021-08-31 PROCEDURE — 65620000000 HC RM CCU GENERAL

## 2021-08-31 PROCEDURE — 83735 ASSAY OF MAGNESIUM: CPT

## 2021-08-31 PROCEDURE — 74011250637 HC RX REV CODE- 250/637: Performed by: PSYCHIATRY & NEUROLOGY

## 2021-08-31 PROCEDURE — 36415 COLL VENOUS BLD VENIPUNCTURE: CPT

## 2021-08-31 PROCEDURE — 97165 OT EVAL LOW COMPLEX 30 MIN: CPT

## 2021-08-31 PROCEDURE — 80048 BASIC METABOLIC PNL TOTAL CA: CPT

## 2021-08-31 PROCEDURE — 74011250636 HC RX REV CODE- 250/636: Performed by: NURSE PRACTITIONER

## 2021-08-31 PROCEDURE — 80061 LIPID PANEL: CPT

## 2021-08-31 PROCEDURE — 97116 GAIT TRAINING THERAPY: CPT

## 2021-08-31 PROCEDURE — 84100 ASSAY OF PHOSPHORUS: CPT

## 2021-08-31 PROCEDURE — 70551 MRI BRAIN STEM W/O DYE: CPT

## 2021-08-31 PROCEDURE — 82962 GLUCOSE BLOOD TEST: CPT

## 2021-08-31 PROCEDURE — 77010033678 HC OXYGEN DAILY

## 2021-08-31 PROCEDURE — 85027 COMPLETE CBC AUTOMATED: CPT

## 2021-08-31 PROCEDURE — 74011250637 HC RX REV CODE- 250/637: Performed by: NURSE PRACTITIONER

## 2021-08-31 PROCEDURE — 93306 TTE W/DOPPLER COMPLETE: CPT

## 2021-08-31 PROCEDURE — 97161 PT EVAL LOW COMPLEX 20 MIN: CPT

## 2021-08-31 PROCEDURE — 83036 HEMOGLOBIN GLYCOSYLATED A1C: CPT

## 2021-08-31 PROCEDURE — 4A03X5D MEASUREMENT OF ARTERIAL FLOW, INTRACRANIAL, EXTERNAL APPROACH: ICD-10-PCS | Performed by: STUDENT IN AN ORGANIZED HEALTH CARE EDUCATION/TRAINING PROGRAM

## 2021-08-31 RX ORDER — MECLIZINE HYDROCHLORIDE 25 MG/1
25 TABLET ORAL
Status: DISCONTINUED | OUTPATIENT
Start: 2021-08-31 | End: 2021-09-01 | Stop reason: HOSPADM

## 2021-08-31 RX ORDER — ATORVASTATIN CALCIUM 20 MG/1
20 TABLET, FILM COATED ORAL
Status: DISCONTINUED | OUTPATIENT
Start: 2021-08-31 | End: 2021-08-31

## 2021-08-31 RX ORDER — ATORVASTATIN CALCIUM 40 MG/1
40 TABLET, FILM COATED ORAL
Status: DISCONTINUED | OUTPATIENT
Start: 2021-08-31 | End: 2021-09-01

## 2021-08-31 RX ADMIN — PANTOPRAZOLE SODIUM 40 MG: 40 TABLET, DELAYED RELEASE ORAL at 08:20

## 2021-08-31 RX ADMIN — ATORVASTATIN CALCIUM 40 MG: 40 TABLET, FILM COATED ORAL at 21:43

## 2021-08-31 RX ADMIN — Medication 10 ML: at 05:53

## 2021-08-31 RX ADMIN — Medication 10 ML: at 21:41

## 2021-08-31 RX ADMIN — Medication 10 ML: at 14:05

## 2021-08-31 RX ADMIN — ONDANSETRON 4 MG: 2 INJECTION INTRAMUSCULAR; INTRAVENOUS at 01:05

## 2021-08-31 RX ADMIN — Medication 10 ML: at 01:07

## 2021-08-31 NOTE — PROGRESS NOTES
-Please complete MRI History and Safety Screening Form   - Patient cannot be scanned until this form is completed and reviewed in MRI to ensure patient is SAFE and eligible for MRI. - CALL MRI when this has been successfully completed at [phone].

## 2021-08-31 NOTE — ACP (ADVANCE CARE PLANNING)
Advance Care Planning   Advance Care Planning Inpatient Note  [address]  [facility] Care Department    Today's Date: 8/31/2021  Unit: [facility] 2 CRITICAL CARE 1    Received request from in basket. Upon review of chart and communication with care team, patient's decision making abilities are not in question. Patient and Spouse were present in the room during visit. Patient was sleeping and did not wake during visit. Goals of ACP Conversation:  Discuss Advance Care planning documents    Health Care Decision Makers:      Primary Decision Maker: Paty Altman - [phone]      Summary:  Verified Healthcare Decision Maker    Advance Care Planning Documents (Patient Wishes) on file:    None     Assessment:    Patient was sleeping during visit. Spoke with his wife, offered overview of document, and explained state hierarchy of decision makers. Wife shared that they have not had any discussions regarding healthcare wishes. Encouraged conversation. Advised chaplains are available to assist if patient would like to complete document during this admission.      Interventions:  Provided education on documents for clarity and greater understanding  Discussed and provided education on state decision maker hierarchy    Care Preferences Communicated:  No    Outcomes/Plan:  Left Advance Medical Directive document with wife    Juliana Chinchilla [facility] on 8/31/2021 at 12:28 PM

## 2021-08-31 NOTE — PROGRESS NOTES
OCCUPATIONAL THERAPY EVALUATION/DISCHARGE  Patient: Nicanor Alvarenga (73 y.o. male)  Date: 8/31/2021  Primary Diagnosis: Stroke [facility]) [I63.9]       Precautions:        ASSESSMENT  Based on the objective data described below, the patient presents with overall weakness, and stated that he was feeling a little dizzy and slow with walking He stated that he felt as if his body was moving faster than his mind. He lives with family and was working from home, and independent with ADLs and ILS and driving. Wife was present during eval. Pt was able to perform bed mobility with no assist and no use of bed rail. He was able to stand  With CGA and was CGA  For walking in room and with transfers due to pt was feeling weak and a little dizzy. BPs were running high and changed out BP cuff for a larger one and his BP was more stable. Nursing notified. Pt scored 66/66 on Fugl Sumner. He has not issued or deficits with visual tracking and convergence. Pt does state that when he turns his head he feels that his body moves faster than his eyes can move but did not see this when pt was demonstrating head turns. Pt at this time is moving well and close to his baseline and only needed assist to gideon socks due to feeling weak . HE has not OT needs and recommend that pt return home with family and no further OT needed. Patient Vitals for the past 4 hrs:   Temp Pulse Resp BP SpO2   08/31/21 1200 97.9 °F (36.6 °C) 87 21 137/88 94 %   08/31/21 1115  96 17 (!) 140/90 95 %   08/31/21 1111  82 21 133/83 95 %   08/31/21 1108  90  (!) 140/90 94 %   08/31/21 1106    (!) 140/100    08/31/21 1104  94 21 (!) 144/101    08/31/21 1100  90 20 (!) 148/101 95 %   08/31/21 1055  91 17 (!) 157/96 97 %   08/31/21 1000  69 19 (!) 145/88 94 %           Current Level of Function (ADLs/self-care): setup to min for donning socks     Functional Outcome Measure:   The patient scored Total A-D  Total A-D (Motor Function): 66/66 on the Fugl-Sumner Assessment which is indicative of no impairment in upper extremity functional status. PLAN :  Recommendation for discharge: (in order for the patient to meet his/her long term goals)  No skilled occupational therapy/ follow up rehabilitation needs identified at this time. This discharge recommendation:  Has been made in collaboration with the attending provider and/or case management    IF patient discharges home will need the following DME: none       SUBJECTIVE:   Patient stated I just feel weak .     OBJECTIVE DATA SUMMARY:   HISTORY:   Past Medical History:   Diagnosis Date    GERD (gastroesophageal reflux disease)     Hypertension    No past surgical history on file. Prior Level of Function/Environment/Context: pt lives at home with family and was independent with ADLs and ILS and worked from home and did not use AD  Expanded or extensive additional review of patient history:     Home Situation  Home Environment: Private residence  # Steps to Enter: 1 (x3 transitions)  One/Two Story Residence: One story  Living Alone: No  Support Systems: Spouse/Significant Other/Partner, Family member(s)  Patient Expects to be Discharged to[de-identified] House  Current DME Used/Available at Home: None    Hand dominance: Left    EXAMINATION OF PERFORMANCE DEFICITS:  Cognitive/Behavioral Status:  Neurologic State: Alert  Orientation Level: Appropriate for age  Cognition: Appropriate decision making  Perception: Appears intact  Perseveration: No perseveration noted  Safety/Judgement: Awareness of environment    Skin: in good health     Edema: none     Hearing:   Auditory  Auditory Impairment: None    Vision/Perceptual:    Tracking: Able to track stimulus in all quadrants w/o difficulty    Saccades: Within Defined Limits    Convergence: Normal (within 6 inches from nose) intact       Diplopia: No    Acuity:  (needs his contacts)    Corrective Lenses: Contacts    Range of Motion:    AROM: Within functional limits Strength:    Strength: Within functional limits                Coordination:  Coordination: Within functional limits  Fine Motor Skills-Upper: Left Intact; Right Intact    Gross Motor Skills-Upper: Left Intact; Right Intact    Tone & Sensation:    Tone: Normal  Sensation: Intact                      Balance:  Sitting: Intact  Standing: Intact    Functional Mobility and Transfers for ADLs:  Bed Mobility:  Supine to Sit: Independent  Scooting: Independent    Transfers:  Sit to Stand: Modified independent  Stand to Sit: Modified independent  Bed to Chair: Modified independent  Bathroom Mobility: Modified independent  Toilet Transfer : Modified independent    ADL Assessment:  Feeding: Independent    Oral Facial Hygiene/Grooming: Setup    Bathing: Minimum assistance;Setup (needs assist wth feet)    Upper Body Dressing: Independent    Lower Body Dressing: Maximum assistance;Minimum assistance (to gideon socks)            Cognitive Retraining  Safety/Judgement: Awareness of environment         Functional Measure:  Fugl-Sumner Assessment of Motor Recovery after Stroke:   Reflex Activity  Flexors/Biceps/Fingers: Can be elicited  Extensors/Triceps: Can be elicited  Reflex Subtotal: 4    Volitional Movement Within Synergies  Shoulder Retraction: Full  Shoulder Elevation: Full  Shoulder Abduction (90 degrees): Full  Shoulder External Rotation: Full  Elbow Flexion: Full  Forearm Supination: Full  Shoulder Adduction/Internal Rotation: Full  Elbow Extension: Full  Forearm Pronation: Full  Subtotal: 18    Volitional Movement Mixing Synergies  Hand to Lumbar Spine: Full  Shoulder Flexion (0-90 degrees): Full  Pronation-Supination: Full  Subtotal: 6    Volitional Movement With Little or No Synergy  Shoulder Abduction (0-90 degrees): Full  Shoulder Flexion ( degrees): Full  Pronation/Supination: Full  Subtotal : 6    Normal Reflex Activity  Biceps, Triceps, Finger Flexors:  Full  Subtotal : 2    Upper Extremity Total   Upper Extremity Total: 36    Wrist  Stability at 15 Degree Dorsiflexion: Full  Repeated Dorsiflexion/ Volar Flexion: Full  Stability at 15 Degree Dorsiflexion: Full  Repeated Dorsiflexion/ Volar Flexion: Full  Circumduction: Full  Wrist Total: 10    Hand  Mass Flexion: Full  Mass Extension: Full  Grasp A: Full  Grasp B: Full  Grasp C: Full  Grasp D: Full  Grasp E: Full  Hand Total: 14    Coordination/Speed  Tremor: None  Dysmetria: None  Time: <1s  Coordination/Speed Total : 6    Total A-D  Total A-D (Motor Function): 66/66     This is a reliable/valid measure of arm function after a neurological event. It has established value to characterize functional status and for measuring spontaneous and therapy-induced recovery; tests proximal and distal motor functions. Fugl-Sumner Assessment  UE scores recorded between five and 30 days post neurologic event can be used to predict UE recovery at six months post neurologic event. Severe = 0-21 points   Moderately Severe = 22-33 points   Moderate = 34-47 points   Mild = 48-66 points  KEVIN Hernandez, JOSH Patino, & MOE Dubois (1992). Measurement of motor recovery after stroke: Outcome assessment and sample size requirements.  Stroke, 23, pp. 2777-4212.   ------------------------------------------------------------------------------------------------------------------------------------------------------------------  MCID:  Stroke:   Patsey Shone et al, 2001; n = 171; mean age 79 (5) years; assessed within 16 (12) days of stroke, Acute Stroke)  FMA Motor Scores from Admission to Discharge   10 point increase in FMA Upper Extremity = 1.5 change in discharge FIM   10 point increase in FMA Lower Extremity = 1.9 change in discharge FIM  MDC:   Stroke:   Florentin Bueno et al, 2008, n = 14, mean age = 59.9 (14.6) years, assessed on average 14 (6.5) months post stroke, Chronic Stroke)   FMA = 5.2 points for the Upper Extremity portion of the assessment     Occupational Therapy Evaluation Charge Determination   History Examination Decision-Making   LOW Complexity : Brief history review  LOW Complexity : 1-3 performance deficits relating to physical, cognitive , or psychosocial skils that result in activity limitations and / or participation restrictions  LOW Complexity : No comorbidities that affect functional and no verbal or physical assistance needed to complete eval tasks       Based on the above components, the patient evaluation is determined to be of the following complexity level: LOW   Pain Rating:  none    Activity Tolerance:   Fair    After treatment patient left in no apparent distress:    Sitting in chair, Call bell within reach and Caregiver / family present    COMMUNICATION/EDUCATION:   The patients plan of care was discussed with: Physical therapist and Registered nurse. Patient and/or family was verbally educated on the BE FAST acronym for signs/symptoms of CVA and TIA. BE FAST was written on patient's communication board  for visual education and reinforcement. All questions answered with patient indicating good understanding.      Thank you for this referral.  Nadia Cordero OT  Time Calculation: 29 mins

## 2021-08-31 NOTE — PROGRESS NOTES
MRI Pending:    [facility]. Sign and fax to [phone]. Call [phone] once faxed.

## 2021-08-31 NOTE — ED NOTES
Dr Castillo Garcia paged for pt c/o increased dizziness with nausea and vomiting. Noted slight blood tinge in vomit.

## 2021-08-31 NOTE — ED PROVIDER NOTES
EMERGENCY DEPARTMENT HISTORY AND PHYSICAL EXAM      Date: 8/30/2021  Patient Name: Yaakov Anguiano    Please note that this dictation was completed with [employer], the computer voice recognition software. Quite often unanticipated grammatical, syntax, homophones, and other interpretive errors are inadvertently transcribed by the computer software. Please disregard these errors. Please excuse any errors that have escaped final proofreading. History of Presenting Illness     Chief Complaint   Patient presents with    Vomiting     pt presents EMS, w/ c/o vomiting. pt reports vomiting started at 5pm and hasnt resolved      Level once stroke called at 8:45 PM after my evaluation of the patient. History Provided By: Patient     HPI: Yaakov Anguiano, 35 y.o. male, who denies any significant past medical history presenting the emergency department with sudden onset room spinning dizziness associated with nausea and vomiting. Room spinning dizziness does not drew with staying still. It is associated with nonbloody nonbilious emesis. He still feels room spinning dizziness at rest.  Never had any similar previous episodes. No associated headache. No focal neurologic deficits such as facial droop, slurring of speech, weakness in the upper or lower extremities. Symptom on set 530-545 pm.   PCP: Ephraim Thornton MD    No current facility-administered medications on file prior to encounter. Current Outpatient Medications on File Prior to Encounter   Medication Sig Dispense Refill    pantoprazole (PROTONIX) 40 mg tablet TAKE 1 TABLET BY MOUTH EVERY DAY 90 Tab 3       Past History     Past Medical History:  Past Medical History:   Diagnosis Date    GERD (gastroesophageal reflux disease)     Hypertension        Past Surgical History:  No past surgical history on file. Family History:  No family history on file.     Social History:  Social History     Tobacco Use    Smoking status: Never Smoker    Smokeless tobacco: Never Used   Substance Use Topics    Alcohol use: Not on file    Drug use: Not on file       Allergies:  No Known Allergies      Review of Systems   Review of Systems   Constitutional: Negative for chills and fever. HENT: Negative for congestion and sore throat. Eyes: Negative for visual disturbance. Respiratory: Negative for cough and shortness of breath. Cardiovascular: Negative for chest pain and leg swelling. Gastrointestinal: Positive for nausea and vomiting. Negative for abdominal pain, blood in stool and diarrhea. Endocrine: Negative for polyuria. Genitourinary: Negative for dysuria and testicular pain. Musculoskeletal: Negative for arthralgias, joint swelling and myalgias. Skin: Negative for rash. Allergic/Immunologic: Negative for immunocompromised state. Neurological: Positive for dizziness. Negative for weakness and headaches. Hematological: Does not bruise/bleed easily. Psychiatric/Behavioral: Negative for confusion. Physical Exam   Physical Exam  Vitals and nursing note reviewed. Constitutional:       Appearance: He is well-developed. HENT:      Head: Normocephalic and atraumatic. Eyes:      General:         Right eye: No discharge. Left eye: No discharge. Comments:   Nystagmus to the right and vertically. Positive test of skew. Right eye more constricted than left eye. ? Right eye lid drooping   Neck:      Trachea: No tracheal deviation. Cardiovascular:      Rate and Rhythm: Normal rate and regular rhythm. Heart sounds: Normal heart sounds. No murmur heard. Pulmonary:      Effort: Pulmonary effort is normal. No respiratory distress. Breath sounds: Normal breath sounds. No wheezing or rales. Abdominal:      General: Bowel sounds are normal.      Palpations: Abdomen is soft. Tenderness: There is no abdominal tenderness. There is no guarding or rebound. Musculoskeletal:         General: No tenderness or deformity.  Normal range of motion. Cervical back: Normal range of motion and neck supple. Skin:     General: Skin is warm and dry. Findings: No erythema or rash. Neurological:      Mental Status: He is alert and oriented to person, place, and time. Comments: Hints exam concerning for posterior stroke given positive test of skew and vertical nystagmus. No facial droop, no slurring speech, tongue protrudes at midline. Normal finger-to-nose. Normal sensation in the face, arms, legs. Psychiatric:         Behavior: Behavior normal.         Diagnostic Study Results     Labs -     Recent Results (from the past 12 hour(s))   EKG, 12 LEAD, INITIAL    Collection Time: 08/30/21  7:51 PM   Result Value Ref Range    Ventricular Rate 69 BPM    Atrial Rate 69 BPM    P-R Interval 146 ms    QRS Duration 92 ms    Q-T Interval 432 ms    QTC Calculation (Bezet) 462 ms    Calculated P Axis 19 degrees    Calculated R Axis -10 degrees    Calculated T Axis 3 degrees    Diagnosis       Normal sinus rhythm  Minimal voltage criteria for LVH, may be normal variant  No previous ECGs available     CBC WITH AUTOMATED DIFF    Collection Time: 08/30/21  7:55 PM   Result Value Ref Range    WBC 12.8 (H) 4.1 - 11.1 K/uL    RBC 4.69 4.10 - 5.70 M/uL    HGB 14.6 12.1 - 17.0 g/dL    HCT 44.2 36.6 - 50.3 %    MCV 94.2 80.0 - 99.0 FL    MCH 31.1 26.0 - 34.0 PG    MCHC 33.0 30.0 - 36.5 g/dL    RDW 13.2 11.5 - 14.5 %    PLATELET 502 696 - 550 K/uL    MPV 10.4 8.9 - 12.9 FL    NRBC 0.0 0  WBC    ABSOLUTE NRBC 0.00 0.00 - 0.01 K/uL    NEUTROPHILS 71 32 - 75 %    LYMPHOCYTES 20 12 - 49 %    MONOCYTES 6 5 - 13 %    EOSINOPHILS 1 0 - 7 %    BASOPHILS 1 0 - 1 %    IMMATURE GRANULOCYTES 1 (H) 0.0 - 0.5 %    ABS. NEUTROPHILS 9.2 (H) 1.8 - 8.0 K/UL    ABS. LYMPHOCYTES 2.5 0.8 - 3.5 K/UL    ABS. MONOCYTES 0.8 0.0 - 1.0 K/UL    ABS. EOSINOPHILS 0.2 0.0 - 0.4 K/UL    ABS. BASOPHILS 0.1 0.0 - 0.1 K/UL    ABS. IMM.  GRANS. 0.1 (H) 0.00 - 0.04 K/UL    DF AUTOMATED     METABOLIC PANEL, COMPREHENSIVE    Collection Time: 08/30/21  7:55 PM   Result Value Ref Range    Sodium 139 136 - 145 mmol/L    Potassium 3.5 3.5 - 5.1 mmol/L    Chloride 103 97 - 108 mmol/L    CO2 27 21 - 32 mmol/L    Anion gap 9 5 - 15 mmol/L    Glucose 153 (H) 65 - 100 mg/dL    BUN 15 6 - 20 MG/DL    Creatinine 1.10 0.70 - 1.30 MG/DL    BUN/Creatinine ratio 14 12 - 20      GFR est AA >60 >60 ml/min/1.73m2    GFR est non-AA >60 >60 ml/min/1.73m2    Calcium 8.9 8.5 - 10.1 MG/DL    Bilirubin, total 0.3 0.2 - 1.0 MG/DL    ALT (SGPT) 78 12 - 78 U/L    AST (SGOT) 50 (H) 15 - 37 U/L    Alk. phosphatase 79 45 - 117 U/L    Protein, total 8.5 (H) 6.4 - 8.2 g/dL    Albumin 3.5 3.5 - 5.0 g/dL    Globulin 5.0 (H) 2.0 - 4.0 g/dL    A-G Ratio 0.7 (L) 1.1 - 2.2     TROPONIN I    Collection Time: 08/30/21  7:55 PM   Result Value Ref Range    Troponin-I, Qt. <0.05 <0.05 ng/mL   LIPASE    Collection Time: 08/30/21  7:55 PM   Result Value Ref Range    Lipase 80 73 - 393 U/L   COVID-19 RAPID TEST    Collection Time: 08/30/21  8:25 PM   Result Value Ref Range    Specimen source Nasopharyngeal      COVID-19 rapid test Not detected NOTD         Radiologic Studies -   CT CODE NEURO HEAD WO CONTRAST   Final Result         No acute abnormality      CTA CODE NEURO HEAD AND NECK W CONT    (Results Pending)   CT CODE NEURO PERF W CBF    (Results Pending)     CT Results  (Last 48 hours)               08/30/21 2057  CT CODE NEURO HEAD WO CONTRAST Final result    Impression:          No acute abnormality       Narrative:  EXAM: CT CODE NEURO HEAD WO CONTRAST       INDICATION: Code S, Level 1       COMPARISON: None. CONTRAST: None. TECHNIQUE: Unenhanced CT of the head was performed using 5 mm images. Brain and   bone windows were generated. Coronal and sagittal reformats.  CT dose reduction   was achieved through use of a standardized protocol tailored for this   examination and automatic exposure control for dose modulation. FINDINGS:   The ventricles and sulci are normal in size, shape and configuration. . There is   no significant white matter disease. There is no intracranial hemorrhage,   extra-axial collection, or mass effect. The basilar cisterns are open. No CT   evidence of acute infarct. The bone windows demonstrate no abnormalities. The visualized portions of the   paranasal sinuses and mastoid air cells are clear. CXR Results  (Last 48 hours)    None            Medical Decision Making   I am the first provider for this patient. I reviewed the vital signs, available nursing notes, past medical history, past surgical history, family history and social history. Vital Signs-Reviewed the patient's vital signs. Patient Vitals for the past 12 hrs:   Temp Pulse Resp BP SpO2   08/30/21 2133  84 15 (!) 143/81 95 %   08/30/21 2110    (!) 144/100    08/30/21 2021     90 %   08/30/21 1949 97.6 °F (36.4 °C) 73 18 (!) 140/100 96 %       EKG interpretation: (Preliminary)  EKG shows a sinus rhythm, rate 69. Leftward axis. No evidence of ST elevation myocardial infarction. Interpreted by me    Records Reviewed:   Nursing notes, Prior visits     Provider Notes (Medical Decision Making):   Patient presentation concerning for posterior stroke. 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 No other focal deficits NIH is 0 at this time, but given the potential for debilitating injury from a posterior stroke we consented the patient for TPA. Neurology evaluated the patient as well and performed a consent as well verbally. Patient elected to get TPA. Will get CTA of the head. Will admit to the ICU. ED Course:   Initial assessment performed. The patients presenting problems have been discussed, and they are in agreement with the care plan formulated and outlined with them.   I have encouraged them to ask questions as they arise throughout their visit. Telemetry neurology consult. Discussed with Dr. Danny Celis Who saw and evaluated the patient. He agreed that this is likely posterior stroke. Recommended TPA to the patient patient consented. tPA given and 2110        Critical Care Time:   I have spent 44 minutes of critical care time in evaluating and treating this patient. This includes time spent at bedside, time with family and decision makers, documentation, review of labs and imaging, and/or consultation with specialists. It does not include time spent on separately billed procedures. This patient presents with a critical illness or injury that acutely impairs one or more vital organ systems such that there is a high probability of imminent or life threatening deterioration in the patient's condition. This case involved decision making of high complexity to assess, manipulate, and support vital organ system failure and/or to prevent further life threatening deterioration of the patient's condition. Failure to initiate these interventions on an urgent basis would likely result in sudden, clinically significant or life threatening deterioration in the patient's condition. Abnormal findings supporting critical care: Posterior stroke  Interventions to support critical care: TPA administration, specialist consultation, frequent reassessment, emergent head imaging, ICU admission, repeat cardiac monitoring, frequent blood pressure monitoring  Failure to intervene may result in: Worsening neurologic deficit, edema in the posterior fossa that could lead to herniation, death. Disposition:  Patient is being admitted to the hospital by ICU nurse practitioner China elam. The results of their tests and reasons for their admission have been discussed with them and/or available family. They convey agreement and understanding for the need to be admitted and for their admission diagnosis.   Consultation has been made with the inpatient physician specialist for hospitalization. PLAN:  1. Current Discharge Medication List        2. Follow-up Information    None         Return to ED if worse     Diagnosis     Clinical Impression:   1. Posterior circulation stroke ([address]    2. Nausea and vomiting, intractability of vomiting not specified, unspecified vomiting type    3. Vertigo    4. Elevated blood pressure reading    5. Obesity due to excess calories with serious comorbidity, unspecified classification        Attestations:   This note was completed by Stu Maher DO

## 2021-08-31 NOTE — PROGRESS NOTES
Speech Pathology Note    Reviewed chart and note patient admitted with sudden onset room spinning dizziness associated with nausea and vomiting with concern for CVA. Note CT showed no acute abnormality and MRI pending. Note patient passed the STAND and a regular diet was ordered. NIHSS=0. Discussed case with RN who reported no SLP-related concerns. Introduced self and role of SLP to patient. Patient denied any decline in motor speech, language, cognitive, or swallowing function, and patient informally observed drinking thin liquids with no difficulty. Patient Ox4. Formal SLP evaluation not clinically indicated at this time. Will sign off. Please re-consult if further needs arise. Thank you.     Marlena Avelar M.S., CF-SLP

## 2021-08-31 NOTE — PROGRESS NOTES
Interdisciplinary team rounds were held 8/31/2021 with the following team members:Care Management, Diabetes Treatment Specialist, Nursing, Pharmacy,  Physician and Clinical Coordinator. Plan of care discussed. Goal: PT consult. See MD orders and progress notes for further  interventions and desired outcomes.

## 2021-08-31 NOTE — PROGRESS NOTES
TRANSFER - IN REPORT:    Verbal report received from Mena(name) on griddig Heaps  being received from ED(unit) for routine progression of care      Report consisted of patients Situation, Background, Assessment and   Recommendations(SBAR). Information from the following report(s) SBAR, ED Summary, Intake/Output, MAR, Recent Results and Cardiac Rhythm normal sinus was reviewed with the receiving nurse. Opportunity for questions and clarification was provided. 0100 Assessment completed upon patients arrival to unit and care assumed. NIH score of 0, pt with nausea, prn Zofran given per the [facility], refer to the complex assessment flow sheet for details. 0200 bedside swallow evacuation completed, pt passed without difficulty    0500 neuro checks performed Q 30 minutes since admission to floor , NIH remains 0. Pt data base completed. Reassessment completed, changes charted by exception in the complex assessment flow sheet. 0600 Q 1 hour neuro check performed, no changes from the previous assessment. 0700 Q 1 hour neuro check performed, no changes from the previous assessment. 9937 Bedside and Verbal shift change report given to Junior (oncoming nurse) by [place] nurse). Report included the following information SBAR, ED Summary, MAR, Recent Results and Cardiac Rhythm normal sinus.      CARE PLAN    Problem: Patient Education: Go to Patient Education Activity  Goal: Patient/Family Education  Outcome: Progressing Towards Goal     Problem: TIA/CVA Stroke: 0-24 hours  Goal: Activity/Safety  Outcome: Progressing Towards Goal  Goal: Consults, if ordered  Outcome: Progressing Towards Goal  Goal: Diagnostic Test/Procedures  Outcome: Progressing Towards Goal  Goal: Nutrition/Diet  Outcome: Progressing Towards Goal  Goal: Discharge Planning  Outcome: Progressing Towards Goal  Goal: Medications  Outcome: Progressing Towards Goal  Goal: Respiratory  Outcome: Progressing Towards Goal  Goal: Treatments/Interventions/Procedures  Outcome: Progressing Towards Goal  Goal: Minimize risk of bleeding post-thrombolytic infusion  Outcome: Progressing Towards Goal  Goal: Monitor for complications post-thrombolytic infusion  Outcome: Progressing Towards Goal  Goal: Psychosocial  Outcome: Progressing Towards Goal  Goal: *Hemodynamically stable  Outcome: Progressing Towards Goal  Goal: *Neurologically stable  Description: Absence of additional neurological deficits    Outcome: Progressing Towards Goal  Goal: *Verbalizes anxiety and depression are reduced or absent  Outcome: Progressing Towards Goal  Goal: *Absence of Signs of Aspiration on Current Diet  Outcome: Progressing Towards Goal  Goal: *Absence of deep venous thrombosis signs and symptoms(Stroke Metric)  Outcome: Progressing Towards Goal  Goal: *Ability to perform ADLs and demonstrates progressive mobility and function  Outcome: Progressing Towards Goal  Goal: *Stroke education started(Stroke Metric)  Outcome: Progressing Towards Goal  Goal: *Dysphagia screen performed(Stroke Metric)  Outcome: Progressing Towards Goal  Goal: *Rehab consulted(Stroke Metric)  Outcome: Progressing Towards Goal

## 2021-08-31 NOTE — CONSULTS
IP CONSULT TO NEUROLOGY  Consult performed by: Dominic Gomez MD  Consult ordered by: Kevin Cohen NP        Chief Complaint: dizziness    This is a 35year old male with a medical history of hyperlipidemia who is admitted after having experienced sudden vertigo without lateralizing weakness,visual disturbance or sensory loss. He was brought to the emergency department in the TPA window and opted for it given his low risk for bleeding. He notes a strong positional component to his vertigo It is not as pronounce when he is laying down. He has no recent head trauma, he does have chronic tinnitus and denies diabetes. Assesment and Plan    1. Posterior circulation stroke (HCC)  Status post TPA. MRI pending. Normal CT perfusion. Imaging discussed as well as differential diagnosis. 2.  Dizziness  Start meclizine    3. Tinnitus  Chronic may be familial               Allergies  Patient has no known allergies.      Medications  Current Facility-Administered Medications   Medication Dose Route Frequency    atorvastatin (LIPITOR) tablet 20 mg  20 mg Oral QHS    meclizine (ANTIVERT) tablet 25 mg  25 mg Oral Q6H PRN    labetaloL (NORMODYNE;TRANDATE) injection 10 mg  10 mg IntraVENous ONCE PRN    sodium chloride (NS) flush 5-40 mL  5-40 mL IntraVENous Q8H    sodium chloride (NS) flush 5-40 mL  5-40 mL IntraVENous PRN    acetaminophen (TYLENOL) tablet 650 mg  650 mg Oral Q6H PRN    Or    acetaminophen (TYLENOL) suppository 650 mg  650 mg Rectal Q6H PRN    polyethylene glycol (MIRALAX) packet 17 g  17 g Oral DAILY PRN    ondansetron (ZOFRAN ODT) tablet 4 mg  4 mg Oral Q8H PRN    Or    ondansetron (ZOFRAN) injection 4 mg  4 mg IntraVENous Q6H PRN    niCARdipine (CARDENE) 25 mg in 0.9% sodium chloride 250 mL infusion  0-15 mg/hr IntraVENous TITRATE    glucose chewable tablet 16 g  4 Tablet Oral PRN    dextrose (D50W) injection syrg 12.5-25 g  12.5-25 g IntraVENous PRN    glucagon (GLUCAGEN) injection 1 mg 1 mg IntraMUSCular PRN    pantoprazole (PROTONIX) tablet 40 mg  40 mg Oral ACB        Medical History  Past Medical History:   Diagnosis Date    GERD (gastroesophageal reflux disease)     Hypertension        Review of Systems   Constitutional: Negative for chills and fever. HENT: Positive for tinnitus. Negative for ear pain. Eyes: Negative for pain and discharge. Respiratory: Negative for cough and hemoptysis. Cardiovascular: Negative for chest pain and claudication. Gastrointestinal: Negative for constipation and diarrhea. Genitourinary: Negative for flank pain and hematuria. Musculoskeletal: Negative for back pain and myalgias. Skin: Negative for itching and rash. Neurological: Positive for dizziness and headaches. Endo/Heme/Allergies: Negative for environmental allergies. Does not bruise/bleed easily. Psychiatric/Behavioral: Negative for depression and hallucinations. Exam:    Visit Vitals  BP (!) 145/88 (BP 1 Location: Right upper arm, BP Patient Position: Supine)   Pulse 69   Temp 97.8 °F (36.6 °C)   Resp 19   Ht 5' 6\" (1.676 m)   Wt 258 lb 9.6 oz (117.3 kg)   SpO2 94%   BMI 41.74 kg/m²        General:  Laying in bed anxious   Head: Normocephalic, atraumatic, anicteric sclera   Neck Normal ROM,  no thyromegally   Lungs:  Clear to auscultation bilaterally, No wheezes or rubs   Cardiac: Regular rate and rhythm with no murmurs. Abd: Bowel sounds were audible. Ext: No pedal edema   Skin: Supple no rash     NeurologicExam:  Mental Status:  Awake alert oriented to person place and time   Speech: Fluent no aphasia or dysarthria. Cranial Nerves:  opens eyes spontaneously  does respond to sound  no dolls eyes  PERRLA  corneal reflex intact  symmetric grimace  does have a gag reflex    Motor:  Full and symmetric strength of upper and lower extremities. Normal bulk and tone. Reflexes:   Deep tendon reflexes 1+/4 and symmetric.    Sensory:   Symmetric and intact with no perceived deficits    Tremor:   No tremor noted. Cerebellar:  Coordination intact. Neurovascular: No carotid bruits. No JVD         Imaging    CT Results (most recent):  Results from Hospital Encounter encounter on 08/30/21    CT CODE NEURO PERF W CBF    Narrative  PRELIMINARY REPORT:    A perfusion abnormality is not demonstrated    Preliminary report was provided by Dr. Kari Bennett, the on-call radiologist, at 2674    Final report to follow. CLINICAL HISTORY: Code stroke. Vomiting. Dizziness. EXAMINATION:  CT ANGIOGRAPHY HEAD AND NECK, CT PERFUSION    COMPARISON: None    TECHNIQUE:  Following the uneventful administration of iodinated contrast  material, axial CT angiography of the head and neck was performed. Delayed axial  images through the head were also obtained. Coronal and sagittal reconstructions  were obtained. Manual postprocessing of images was performed. 3-D  Sagittal  maximal intensity projection images were obtained. 3-D Coronal maximal  intensity projections were obtained. CT brain perfusion was performed with  generation of hemodynamic maps of multiple parameters, including cerebral blood  flow, cerebral blood volume, mean transit time, and TMAX. CT dose reduction was  achieved through use of a standardized protocol tailored for this examination  and automatic exposure control for dose modulation. This study was analyzed by  the [facility] [address] ai algorithm. FINDINGS:    Delayed contrast-enhanced head CT:    The ventricles are midline without hydrocephalus. There is no acute intra or  extra-axial hemorrhage. The basal cisterns are clear. The paranasal sinuses are  clear. CTA NECK:    Great vessels: Normal arch anatomy with the origins patent. Right subclavian artery: Patent    Left subclavian artery: Patent    Right common carotid artery: Patent    Left common carotid artery: Patent    Cervical right internal carotid artery: Patent with no significant stenosis by  NASCET criteria.     Cervical left internal carotid artery: Patent with no significant stenosis by  NASCET criteria. Right vertebral artery: Patent    Left vertebral artery: Patent    The lung apices are clear. The thyroid is homogeneous. No cervical  lymphadenopathy. CTA HEAD:    Right cavernous internal carotid artery: Patent    Left cavernous internal carotid artery: Patent    Anterior cerebral arteries: Patent    Anterior communicating artery: Patent    Right middle cerebral artery: Patent    Left middle cerebral artery: Patent    Posterior communicating arteries: Patent    Posterior cerebral arteries: Patent    Basilar artery: Patent. Fenestrated. Distal vertebral arteries: Patent    No evidence for intracranial aneurysm or hemodynamically significant stenosis. CT Perfusion:  Normal CT perfusion. Impression  No large vessel occlusion, hemodynamically significant carotid stenosis, or  perfusion abnormality. MRI Results (most recent):  No results found for this or any previous visit.       .  Lab Review    Recent Results (from the past 24 hour(s))   EKG, 12 LEAD, INITIAL    Collection Time: 08/30/21  7:51 PM   Result Value Ref Range    Ventricular Rate 69 BPM    Atrial Rate 69 BPM    P-R Interval 146 ms    QRS Duration 92 ms    Q-T Interval 432 ms    QTC Calculation (Bezet) 462 ms    Calculated P Axis 19 degrees    Calculated R Axis -10 degrees    Calculated T Axis 3 degrees    Diagnosis       Normal sinus rhythm  Minimal voltage criteria for LVH, may be normal variant  No previous ECGs available  Confirmed by Heloise Eisenmenger ([id number]) on 8/31/2021 9:47:33 AM     CBC WITH AUTOMATED DIFF    Collection Time: 08/30/21  7:55 PM   Result Value Ref Range    WBC 12.8 (H) 4.1 - 11.1 K/uL    RBC 4.69 4.10 - 5.70 M/uL    HGB 14.6 12.1 - 17.0 g/dL    HCT 44.2 36.6 - 50.3 %    MCV 94.2 80.0 - 99.0 FL    MCH 31.1 26.0 - 34.0 PG    MCHC 33.0 30.0 - 36.5 g/dL    RDW 13.2 11.5 - 14.5 %    PLATELET 547 896 - 536 K/uL    MPV 10.4 8.9 - 12.9 FL NRBC 0.0 0  WBC    ABSOLUTE NRBC 0.00 0.00 - 0.01 K/uL    NEUTROPHILS 71 32 - 75 %    LYMPHOCYTES 20 12 - 49 %    MONOCYTES 6 5 - 13 %    EOSINOPHILS 1 0 - 7 %    BASOPHILS 1 0 - 1 %    IMMATURE GRANULOCYTES 1 (H) 0.0 - 0.5 %    ABS. NEUTROPHILS 9.2 (H) 1.8 - 8.0 K/UL    ABS. LYMPHOCYTES 2.5 0.8 - 3.5 K/UL    ABS. MONOCYTES 0.8 0.0 - 1.0 K/UL    ABS. EOSINOPHILS 0.2 0.0 - 0.4 K/UL    ABS. BASOPHILS 0.1 0.0 - 0.1 K/UL    ABS. IMM. GRANS. 0.1 (H) 0.00 - 0.04 K/UL    DF AUTOMATED     METABOLIC PANEL, COMPREHENSIVE    Collection Time: 08/30/21  7:55 PM   Result Value Ref Range    Sodium 139 136 - 145 mmol/L    Potassium 3.5 3.5 - 5.1 mmol/L    Chloride 103 97 - 108 mmol/L    CO2 27 21 - 32 mmol/L    Anion gap 9 5 - 15 mmol/L    Glucose 153 (H) 65 - 100 mg/dL    BUN 15 6 - 20 MG/DL    Creatinine 1.10 0.70 - 1.30 MG/DL    BUN/Creatinine ratio 14 12 - 20      GFR est AA >60 >60 ml/min/1.73m2    GFR est non-AA >60 >60 ml/min/1.73m2    Calcium 8.9 8.5 - 10.1 MG/DL    Bilirubin, total 0.3 0.2 - 1.0 MG/DL    ALT (SGPT) 78 12 - 78 U/L    AST (SGOT) 50 (H) 15 - 37 U/L    Alk.  phosphatase 79 45 - 117 U/L    Protein, total 8.5 (H) 6.4 - 8.2 g/dL    Albumin 3.5 3.5 - 5.0 g/dL    Globulin 5.0 (H) 2.0 - 4.0 g/dL    A-G Ratio 0.7 (L) 1.1 - 2.2     TROPONIN I    Collection Time: 08/30/21  7:55 PM   Result Value Ref Range    Troponin-I, Qt. <0.05 <0.05 ng/mL   LIPASE    Collection Time: 08/30/21  7:55 PM   Result Value Ref Range    Lipase 80 73 - 393 U/L   COVID-19 RAPID TEST    Collection Time: 08/30/21  8:25 PM   Result Value Ref Range    Specimen source Nasopharyngeal      COVID-19 rapid test Not detected NOTD     GLUCOSE, POC    Collection Time: 08/30/21 11:54 PM   Result Value Ref Range    Glucose (POC) 132 (H) 65 - 117 mg/dL    Performed by [address] [place] [address], POC    Collection Time: 08/31/21  5:52 AM   Result Value Ref Range    Glucose (POC) 107 65 - 117 mg/dL    Performed by Paloma Rocha    CBC W/O DIFF Collection Time: 08/31/21  5:54 AM   Result Value Ref Range    WBC 10.9 4.1 - 11.1 K/uL    RBC 4.48 4.10 - 5.70 M/uL    HGB 14.2 12.1 - 17.0 g/dL    HCT 42.8 36.6 - 50.3 %    MCV 95.5 80.0 - 99.0 FL    MCH 31.7 26.0 - 34.0 PG    MCHC 33.2 30.0 - 36.5 g/dL    RDW 13.2 11.5 - 14.5 %    PLATELET 350 215 - 334 K/uL    MPV 10.4 8.9 - 12.9 FL    NRBC 0.0 0  WBC    ABSOLUTE NRBC 0.00 0.00 - 9.28 K/uL   METABOLIC PANEL, BASIC    Collection Time: 08/31/21  5:54 AM   Result Value Ref Range    Sodium 138 136 - 145 mmol/L    Potassium 4.1 3.5 - 5.1 mmol/L    Chloride 104 97 - 108 mmol/L    CO2 27 21 - 32 mmol/L    Anion gap 7 5 - 15 mmol/L    Glucose 111 (H) 65 - 100 mg/dL    BUN 12 6 - 20 MG/DL    Creatinine 0.93 0.70 - 1.30 MG/DL    BUN/Creatinine ratio 13 12 - 20      GFR est AA >60 >60 ml/min/1.73m2    GFR est non-AA >60 >60 ml/min/1.73m2    Calcium 9.0 8.5 - 10.1 MG/DL   MAGNESIUM    Collection Time: 08/31/21  5:54 AM   Result Value Ref Range    Magnesium 2.3 1.6 - 2.4 mg/dL   PHOSPHORUS    Collection Time: 08/31/21  5:54 AM   Result Value Ref Range    Phosphorus 4.4 2.6 - 4.7 MG/DL

## 2021-08-31 NOTE — ED NOTES
Pt resting comfortably in stretcher. NAD, wife at bedside. Call bell in reach, will continue to monitor.

## 2021-08-31 NOTE — PROGRESS NOTES
PHYSICAL THERAPY EVALUATION WITH DISCHARGE  Patient: Crystal Chacon (43 y.o. male)  Date: 8/31/2021  Primary Diagnosis: Stroke [facility]) [I63.9]       Precautions:          ASSESSMENT  Based on the objective data described below, the patient presents with safe independent mobility and near baseline functional status following admission for a CVA and s/p tPA. Marya [place] He reports severe dizziness and nausea that was exacerbated with any head movement upon admission. Noted nystagmus observed prior TPA. During PT evaluation, he c/o mild dizziness or feeling \"groggy. \"  No observed neuro deficits and vitals stable throughout. He was able to complete transfers independently and ambulate 150' without difficulty. Encouraged progressive mobility following discharge. He lives with a supportive wife and no follow therapy needs identified. Will sigh off. Further skilled acute physical therapy is not indicated at this time. PLAN :  Recommendation for discharge: (in order for the patient to meet his/her long term goals)  No skilled physical therapy/ follow up rehabilitation needs identified at this time. IF patient discharges home will need the following DME: none         SUBJECTIVE:   Patient stated I'm just a little groggy.     OBJECTIVE DATA SUMMARY:   HISTORY:    Past Medical History:   Diagnosis Date    GERD (gastroesophageal reflux disease)     Hypertension    No past surgical history on file.     Prior level of function: independent and active  Personal factors and/or comorbidities impacting plan of care:     Home Situation  Home Environment: Private residence  # Steps to Enter: 1 (x3 transitions)  One/Two Story Residence: One story  Living Alone: No  Support Systems: Spouse/Significant Other/Partner, Family member(s)  Patient Expects to be Discharged to[de-identified] House  Current DME Used/Available at Home: None    EXAMINATION/PRESENTATION/DECISION MAKING:   Critical Behavior:  Neurologic State: Alert  Orientation Level: Oriented X4  Cognition: Appropriate decision making, Appropriate for age attention/concentration, Appropriate safety awareness, Follows commands     Hearing: Auditory  Auditory Impairment: None  Skin:    Edema:   Range Of Motion:  AROM: Within functional limits                       Strength:    Strength: Within functional limits                    Tone & Sensation:   Tone: Normal              Sensation: Intact               Coordination:  Coordination: Within functional limits  Vision:      Functional Mobility:  Bed Mobility:     Supine to Sit: Independent     Scooting: Independent  Transfers:  Sit to Stand: Modified independent  Stand to Sit: Modified independent                       Balance:   Sitting: Intact  Standing: Intact  Ambulation/Gait Training:  Distance (ft): 150 Feet (ft)     Ambulation - Level of Assistance: Independent     Gait Description (WDL): Exceptions to WDL  Gait Abnormalities: Altered arm swing         Physical Therapy Evaluation Charge Determination   History Examination Presentation Decision-Making   MEDIUM  Complexity : 1-2 comorbidities / personal factors will impact the outcome/ POC  MEDIUM Complexity : 3 Standardized tests and measures addressing body structure, function, activity limitation and / or participation in recreation  LOW Complexity : Stable, uncomplicated  LOW Complexity : FOTO score of       Based on the above components, the patient evaluation is determined to be of the following complexity level: LOW     Pain Rating:      Activity Tolerance:   Good    After treatment patient left in no apparent distress:   Sitting in chair and Call bell within reach    COMMUNICATION/EDUCATION:   The patients plan of care was discussed with: Registered nurse. Fall prevention education was provided and the patient/caregiver indicated understanding.     Thank you for this referral.  Guanako Granados, PT, DPT   Time Calculation: 26 mins

## 2021-08-31 NOTE — PROGRESS NOTES
1015 [place] report received from Eileen Vera, [place]. Report included relevant PMH, plan for the day, lab results. 1100 Seen by PT/OT. Ambulated in hallway and left sitting up in chair. Both PT and OT signed off on patient. 1200 Assessment completed, see flowsheets. 1255 MRI screening assessment completed and faxed to MRI department. Signed copy in chart. 1600 Reassessment completed, see flowsheets. 1900 Bedside shift change report given to HANH Velasco (oncoming nurse) by Tere Logan RN (offgoing nurse). Report included the following information SBAR, Kardex, ED Summary, Procedure Summary and Intake/Output.

## 2021-08-31 NOTE — PROGRESS NOTES
ICU DAILY PROGRESS NOTE      Summary  34 y/o male admitted for acute dizziness. No other neurologicl deficits or complaints. He was given TPA in the ED after CT head negative for possible stroke. He was then sent to the ICU for further observation. PMH significant for hyperlipidemia, chronic tinnitus. Previous 24 Hours    S/p TPA. [facility] Day 1    Critical Care Problems    Patient Active Problem List   Diagnosis Code    GERD with esophagitis K21.00    Pure hypercholesterolemia E78.00    Prediabetes R73.03    Stroke [facility]) I63.9         Plan    Neuro  Stroke: plan is for MRI today. Appreciate Neurology input. His NIH score is 0    Cont PT/OT    [facility]  SBP goal < 180    treat hyperlipidemia with atorvastatin. Pulm  Asymptomatic. Lungs clear to auscultation    GI  Ok to have diet    Renal  Lytes acceptable    Endo  BS acceptable. BS goal     ID  Afebrile.  wBC wnl    Heme  S/p TPA    Vitals  Visit Vitals  BP (!) 149/91 (BP 1 Location: Right upper arm, BP Patient Position: At rest)   Pulse (!) 102   Temp 97.9 °F (36.6 °C)   Resp 21   Ht 5' 6\" (1.676 m)   Wt 117.3 kg (258 lb 9.6 oz)   SpO2 96%   BMI 41.74 kg/m²    O2 Flow Rate (L/min): 2 l/min O2 Device: None (Room air) Temp (24hrs), Av.9 °F (36.6 °C), Min:97.6 °F (36.4 °C), Max:98.1 °F (36.7 °C)           Intake/Output:     Intake/Output Summary (Last 24 hours) at 2021 1509  Last data filed at 2021 1200  Gross per 24 hour   Intake 720 ml   Output 1000 ml   Net -280 ml         Physical exam:        I have examined the patient on this day 2021 and the above documented exam is accurate including the components that have been copied forward    LABS AND  DATA: Personally reviewed  Recent Labs     21   WBC 10.9 12.8*   HGB 14.2 14.6   HCT 42.8 44.2    287     Recent Labs     21    139   K 4.1 3.5    103   CO2 27 27   BUN 12 15   CREA 0.93 1.10   * 153*   CA 9.0 8.9   MG 2.3  --    PHOS 4.4  --      Recent Labs     08/30/21 1955   AP 79   TP 8.5*   ALB 3.5   GLOB 5.0*   LPSE 80     No results for input(s): INR, PTP, APTT, INREXT in the last 72 hours. No results for input(s): PHI, PCO2I, PO2I, FIO2I in the last 72 hours. Recent Labs     08/30/21 1955   TROIQ <0.05       Hemodynamics:   PAP:   CO:     Wedge:   CI:     CVP:    SVR:       PVR:       Ventilator Settings:  Mode Rate Tidal Volume Pressure FiO2 PEEP                    Peak airway pressure:      Minute ventilation:          MEDS: Reviewed    Chest X-Ray:  CXR Results  (Last 48 hours)    None                Multidisciplinary Rounds Completed:  Yes      DISPOSITION  Stay in ICU    CRITICAL CARE CONSULTANT NOTE  I had a in-person encounter with Sheila Castillo, reviewed and interpreted patient data including events, labs, images, vital signs, I/O's, and examined patient. I have discussed the case and the plan and management of the patient's care with the consulting services, the bedside nurses and the respiratory therapist.      NOTE OF PERSONAL INVOLVEMENT IN CARE   This patient is at high risk for sudden and clinically significant deterioration, which requires the highest level of preparedness to intervene urgently. I participated in the decision-making and personally managed or directed the management of the following life and organ supporting interventions that required my frequent assessment to treat or prevent imminent deterioration. I personally spent 40 minutes of critical care time. This is time spent at patient's bedside actively involved in patient care as well as the coordination of care and discussions with the patient's family. This does not include any procedural time which has been billed separately.       ------------------------------------------------------------------------------    Candelario Tanner MD, PhD  [address]  [phone]

## 2021-08-31 NOTE — PROGRESS NOTES
80- Report received from [facility] AT [facility]. 2000- Assessment complete, see flowsheet. NIH 0, no complaints or deficits noted. Wife at bedside. 2055- Pt transported to MRI, NIH 0.  2200- Pt returned to room from MRI. Reassess unchanged, NIH remains 0. Pt up to bathroom to void and brush teeth for bed. 0000- Reassess unchanged. 0400- Reassess unchanged. 0720- Bedside and verbal report given to [facility].

## 2021-08-31 NOTE — H&P
History and Physical    Patient: Sheila Castillo MRN: [id number]  SSN: [id number]    YOB: 1980  Age: 35 y.o. Sex: male      Subjective:      Sheila Castillo is a 35 y.o. male w PMH of GERD and HTN who presented to the ED with sudden onset dizziness, N/V around 1730-45 8/30 prompting him to call EMS. He denied previous similar episodes. Denied associated HA. He did not demonstrate associated facial droop, slurring of speech or weakness of extremities. 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 NIH 0. Neuro tele consulted. CT head negative. Due to potential for posterior stroke and potential worsening deficits, TPA administered. Patient admitted to ICU for further management. Past Medical History:   Diagnosis Date    GERD (gastroesophageal reflux disease)     Hypertension      No past surgical history on file. No family history on file. Social History     Tobacco Use    Smoking status: Never Smoker    Smokeless tobacco: Never Used   Substance Use Topics    Alcohol use: Not on file      Prior to Admission medications    Medication Sig Start Date End Date Taking?  Authorizing Provider   pantoprazole (PROTONIX) 40 mg tablet TAKE 1 TABLET BY MOUTH EVERY DAY 4/5/21   Vini Wayne MD      No Known Allergies    Review of Systems:  As per HPI    Objective:     Vitals:    08/30/21 1949 08/30/21 2021 08/30/21 2110 08/30/21 2133   BP: (!) 140/100  (!) 144/100 (!) 143/81   Pulse: 73   84   Resp: 18   15   Temp: 97.6 °F (36.4 °C)      SpO2: 96% 90%  95%   Weight: 113.4 kg (250 lb)      Height: 5' 6\" (1.676 m)         Physical Exam:  GENERAL: Awake, alert  EYE: Nystagmus bilaterally; PERRLA  THROAT & NECK: Supple, no JVD  LUNG: CTAB  HEART: RRR, 2+ pulses, no JVD  ABDOMEN: Soft, nontender, nondistended  EXTREMITIES: 2+ pulses, no edema  SKIN: c/d/i  NEUROLOGIC:No focal neurologic deficits, 5/5 strength throughout, + nystagmus as noted above; fluent speech; a/o x4  PSYCHIATRIC:Approrpiate affect    Assessment:     Possible posterior ischemic stroke: Status post TPA  - BP management as below  - Neurology following, appreciate recs  - PT/OT/SLP  - Neuroscience ischemic stroke order set  - Repeat CT/MRI per neurology  - Lipid panel  - Unable to start ASA due to TPA administration   - TTE    HTN: Cardene to keep SBP <160    Hyperglycemia: no hx DM  - A1c  - SSI    GERD: PPI    Morbid obesity: Weight loss counseling    CRITICAL CARE CONSULTANT ATTESTATION  I had a face to face encounter with the patient, reviewed and interpreted patient data including clinical events, labs, images, vital signs, I/O's, and examined patient. I have discussed the case and the plan and management of the patient's care with the consulting services, the bedside nurses and the respiratory therapist.      NOTE OF PERSONAL INVOLVEMENT IN CARE   This patient has a high probability of imminent, clinically significant deterioration, which requires the highest level of preparedness to intervene urgently. I participated in the decision-making and personally managed or directed the management of the following life and organ supporting interventions that required my frequent assessment to treat or prevent imminent deterioration. I personally spent 45 minutes of critical care time. This is time spent at this critically ill patient's bedside actively involved in patient care as well as the coordination of care and discussions with the patient's family. This does not include any procedural time which has been billed separately.     JUAN [facility]  8/30/2021

## 2021-08-31 NOTE — ED NOTES
Patient is being transferred to [facility], Room # [phone]. Report given to Fabian Sibley on Emilia Bowman for routine progression of care. Report consisted of the following information SBAR, ED Summary, Procedure Summary, MAR, Recent Results and Cardiac Rhythm sr. Patient transferred to receiving unit by: Amanda Heredia RN. Outstanding consults needed: No     Next labs due: Yes    The following personal items will be sent with the patient during transfer to the floor: All valuables:    Cardiac monitoring ordered: Yes    The following CURRENT information was reported to the receiving RN:    Code status: Full Code at time of transfer    Last set of vital signs:  Vital Signs  Level of Consciousness: Alert (0) (08/30/21 2300)  Temp: 97.6 °F (36.4 °C) (08/30/21 1949)  Temp Source: Oral (08/30/21 1949)  Pulse (Heart Rate): 70 (08/31/21 0030)  Heart Rate Source: Monitor (08/30/21 1949)  Resp Rate: 21 (08/31/21 0030)  BP: (!) 131/90 (08/31/21 0030)  MAP (Monitor): 99 (08/31/21 0030)  MAP (Calculated): 104 (08/31/21 0030)  BP 1 Location: Left upper arm (08/30/21 1949)  BP 1 Method: Automatic (08/30/21 1949)  BP Patient Position: At rest (08/30/21 1949)  MEWS Score: 1 (08/30/21 1949)         Oxygen Therapy  O2 Sat (%): 96 % (08/31/21 0030)  Pulse via Oximetry: 70 beats per minute (08/31/21 0030)  O2 Device: None (Room air) (08/30/21 2300)      Last pain assessment:         Wounds: No     Urinary catheter: voiding  Is there a whitley order: No     LDAs:       Peripheral IV 08/30/21 Left Antecubital (Active)       Peripheral IV 08/30/21 Posterior;Right Forearm (Active)         Opportunity for questions and clarification was provided.     Yeny Kuo RN Pt p#[phone], pt returning call to dr Jacque Calderon nurse

## 2021-08-31 NOTE — PROGRESS NOTES
0730- Bedside and Verbal shift change report given to Jose Arredondo RN (oncoming nurse) by Nisa Solano RN (offgoing nurse). Report included the following information SBAR, Kardex, ED Summary, Recent Results and Cardiac Rhythm Normal Sinus Rhythm. 0800- Shift assessment complete, see flowsheet for detials. pt on 2L NC sat 98, removed O2 during assessment and pt. Tolerated well.

## 2021-08-31 NOTE — PROGRESS NOTES
Spiritual Care Assessment/Progress Note  [facility]      NAME: Sheliah Meckel      MRN: [id number]  AGE: 35 y.o.  SEX: male  Holiness Affiliation: Lutheran   Language: English     8/31/2021     Total Time (in minutes): 31     Spiritual Assessment begun in [facility] through conversation with:         []Patient        [x] Family    [] Friend(s)        Reason for Consult: Advance medical directive consult     Spiritual beliefs: (Please include comment if needed)     [] Identifies with a florinda tradition:         [] Supported by a florinda community:            [] Claims no spiritual orientation:           [] Seeking spiritual identity:                [] Adheres to an individual form of spirituality:           [x] Not able to assess:                           Identified resources for coping:      [] Prayer                               [] Music                  [] Guided Imagery     [] Family/friends                 [] Pet visits     [x] Devotional reading                         [] Unknown     [] Other:                                             Interventions offered during this visit: (See comments for more details)          Family/Friend(s): Advance medical directive consult     Plan of Care:     [] Support spiritual and/or cultural needs    [x] Support AMD and/or advance care planning process      [] Support grieving process   [] Coordinate Rites and/or Rituals    [] Coordination with community clergy   [] No spiritual needs identified at this time   [] Detailed Plan of Care below (See Comments)  [] Make referral to Music Therapy  [] Make referral to Pet Therapy     [] Make referral to Addiction services  [] Make referral to [facility]  [] Make referral to Spiritual Care Partner  [] No future visits requested        [x] Follow up upon further referrals     Comments:  Advance Care Planning Inpatient Note  [facility] Department    Today's Date: 8/31/2021  Unit: [facility]    Received request from in basket. Upon review of chart and communication with care team, patient's decision making abilities are not in question. Patient and Spouse were present in the room during visit. Patient was sleeping and did not wake during visit. Goals of ACP Conversation:  Discuss Advance Care planning documents    Health Care Decision Makers:      Primary Decision Maker: Benjy Bell - [phone]      Summary:  Verified Healthcare Decision Maker    Advance Care Planning Documents (Patient Wishes) on file:    None     Assessment:    Patient was sleeping during visit. Spoke with his wife, offered overview of document, and explained state hierarchy of decision makers. Wife shared that they have not had any discussions regarding healthcare wishes. Encouraged conversation. Advised chaplains are available to assist if patient would like to complete document during this admission. Interventions:  Provided education on documents for clarity and greater understanding  Discussed and provided education on state decision maker hierarchy    Care Preferences Communicated:  No    Outcomes/Plan:  Left Advance Medical Directive document with wife    Seble Sharma, [address] on 8/31/2021 at 12:28 PM    No spiritual needs or concerns were expressed at this time.        Malachi Tsai, MPS, [address], Staff [address]    [address] Paging Service  [phone])

## 2021-08-31 NOTE — ED NOTES
Pt seen by Dr Frances De L aO, level 1 code stroke called, pt taken to CT with neuro tele, decision for TPA. Pt gives verbal consent. Bolus given and infusion started per [facility]

## 2021-09-01 VITALS
HEART RATE: 74 BPM | RESPIRATION RATE: 16 BRPM | OXYGEN SATURATION: 96 % | DIASTOLIC BLOOD PRESSURE: 95 MMHG | HEIGHT: 66 IN | WEIGHT: 250 LBS | TEMPERATURE: 98.5 F | SYSTOLIC BLOOD PRESSURE: 133 MMHG | BODY MASS INDEX: 40.18 KG/M2

## 2021-09-01 LAB
ANION GAP SERPL CALC-SCNC: 6 MMOL/L (ref 5–15)
BUN SERPL-MCNC: 13 MG/DL (ref 6–20)
BUN/CREAT SERPL: 13 (ref 12–20)
CALCIUM SERPL-MCNC: 9.2 MG/DL (ref 8.5–10.1)
CHLORIDE SERPL-SCNC: 105 MMOL/L (ref 97–108)
CO2 SERPL-SCNC: 28 MMOL/L (ref 21–32)
CREAT SERPL-MCNC: 0.98 MG/DL (ref 0.7–1.3)
ERYTHROCYTE [DISTWIDTH] IN BLOOD BY AUTOMATED COUNT: 13.4 % (ref 11.5–14.5)
GLUCOSE SERPL-MCNC: 107 MG/DL (ref 65–100)
HCT VFR BLD AUTO: 44.3 % (ref 36.6–50.3)
HGB BLD-MCNC: 14.8 G/DL (ref 12.1–17)
MAGNESIUM SERPL-MCNC: 2.3 MG/DL (ref 1.6–2.4)
MCH RBC QN AUTO: 31.6 PG (ref 26–34)
MCHC RBC AUTO-ENTMCNC: 33.4 G/DL (ref 30–36.5)
MCV RBC AUTO: 94.7 FL (ref 80–99)
NRBC # BLD: 0 K/UL (ref 0–0.01)
NRBC BLD-RTO: 0 PER 100 WBC
PHOSPHATE SERPL-MCNC: 3.5 MG/DL (ref 2.6–4.7)
PLATELET # BLD AUTO: 247 K/UL (ref 150–400)
PMV BLD AUTO: 10.3 FL (ref 8.9–12.9)
POTASSIUM SERPL-SCNC: 3.8 MMOL/L (ref 3.5–5.1)
RBC # BLD AUTO: 4.68 M/UL (ref 4.1–5.7)
SODIUM SERPL-SCNC: 139 MMOL/L (ref 136–145)
WBC # BLD AUTO: 7.7 K/UL (ref 4.1–11.1)

## 2021-09-01 PROCEDURE — 74011250637 HC RX REV CODE- 250/637: Performed by: INTERNAL MEDICINE

## 2021-09-01 PROCEDURE — 83735 ASSAY OF MAGNESIUM: CPT

## 2021-09-01 PROCEDURE — 80048 BASIC METABOLIC PNL TOTAL CA: CPT

## 2021-09-01 PROCEDURE — 85027 COMPLETE CBC AUTOMATED: CPT

## 2021-09-01 PROCEDURE — 74011250637 HC RX REV CODE- 250/637: Performed by: NURSE PRACTITIONER

## 2021-09-01 PROCEDURE — 84100 ASSAY OF PHOSPHORUS: CPT

## 2021-09-01 PROCEDURE — 36415 COLL VENOUS BLD VENIPUNCTURE: CPT

## 2021-09-01 PROCEDURE — 77010033678 HC OXYGEN DAILY

## 2021-09-01 PROCEDURE — 99233 SBSQ HOSP IP/OBS HIGH 50: CPT | Performed by: PSYCHIATRY & NEUROLOGY

## 2021-09-01 RX ORDER — ATORVASTATIN CALCIUM 40 MG/1
40 TABLET, FILM COATED ORAL
Qty: 30 TABLET | Refills: 1 | Status: SHIPPED | OUTPATIENT
Start: 2021-09-02 | End: 2021-11-23 | Stop reason: SDUPTHER

## 2021-09-01 RX ORDER — ATORVASTATIN CALCIUM 40 MG/1
40 TABLET, FILM COATED ORAL
Status: DISCONTINUED | OUTPATIENT
Start: 2021-09-01 | End: 2021-09-01 | Stop reason: HOSPADM

## 2021-09-01 RX ORDER — PANTOPRAZOLE SODIUM 40 MG/1
40 TABLET, DELAYED RELEASE ORAL
Qty: 30 TABLET | Refills: 1 | Status: SHIPPED | OUTPATIENT
Start: 2021-09-02 | End: 2022-01-20 | Stop reason: SDUPTHER

## 2021-09-01 RX ORDER — GUAIFENESIN 100 MG/5ML
81 LIQUID (ML) ORAL DAILY
Qty: 30 TABLET | Refills: 1 | Status: SHIPPED | OUTPATIENT
Start: 2021-09-01

## 2021-09-01 RX ORDER — GUAIFENESIN 100 MG/5ML
81 LIQUID (ML) ORAL DAILY
Status: DISCONTINUED | OUTPATIENT
Start: 2021-09-01 | End: 2021-09-01 | Stop reason: HOSPADM

## 2021-09-01 RX ADMIN — ATORVASTATIN CALCIUM 40 MG: 40 TABLET, FILM COATED ORAL at 09:46

## 2021-09-01 RX ADMIN — Medication 10 ML: at 06:19

## 2021-09-01 RX ADMIN — PANTOPRAZOLE SODIUM 40 MG: 40 TABLET, DELAYED RELEASE ORAL at 08:30

## 2021-09-01 RX ADMIN — ASPIRIN 81 MG CHEWABLE TABLET 81 MG: 81 TABLET CHEWABLE at 09:46

## 2021-09-01 NOTE — PROGRESS NOTES
0700 Bedside shift change report received from HANH Velasco (offgoing nurse). Report included the following information SBAR, Kardex, Procedure Summary and MAR.    0740 Spoke to Dr. Eliot Gillespie about possible discharge orders. 0800 Shift assessment completed, see flowsheets     0930 Discharge orders acknowledged. Care management aware of follow appointment needs. ASA and Statin orders placed by MD. See MAR. All neuro education completed by Everardo Kelley RN. Patient has no questions at this time. 1030 Flu shot offered to patient. Patient refusing vaccine at this time. 1200 Reassessment completed, see flowsheets. 1310 Patient discharged via main entrance to wife's car.

## 2021-09-01 NOTE — PROGRESS NOTES
Problem: Falls - Risk of  Goal: *Absence of Falls  Description: Document Rolf Morillo Fall Risk and appropriate interventions in the flowsheet.   Outcome: Progressing Towards Goal  Note: Fall Risk Interventions:  Mobility Interventions: Bed/chair exit alarm, Patient to call before getting OOB         Medication Interventions: Bed/chair exit alarm, Patient to call before getting OOB, Teach patient to arise slowly    Elimination Interventions: Call light in reach, Patient to call for help with toileting needs, Urinal in reach              Problem: Patient Education: Go to Patient Education Activity  Goal: Patient/Family Education  Outcome: Progressing Towards Goal     Problem: Patient Education: Go to Patient Education Activity  Goal: Patient/Family Education  Outcome: Progressing Towards Goal     Problem: TIA/CVA Stroke: 0-24 hours  Goal: Off Pathway (Use only if patient is Off Pathway)  Outcome: Progressing Towards Goal  Goal: Activity/Safety  Outcome: Progressing Towards Goal  Goal: Consults, if ordered  Outcome: Progressing Towards Goal  Goal: Diagnostic Test/Procedures  Outcome: Progressing Towards Goal  Goal: Nutrition/Diet  Outcome: Progressing Towards Goal  Goal: Discharge Planning  Outcome: Progressing Towards Goal  Goal: Medications  Outcome: Progressing Towards Goal  Goal: Respiratory  Outcome: Progressing Towards Goal  Goal: Treatments/Interventions/Procedures  Outcome: Progressing Towards Goal  Goal: Minimize risk of bleeding post-thrombolytic infusion  Outcome: Progressing Towards Goal  Goal: Monitor for complications post-thrombolytic infusion  Outcome: Progressing Towards Goal  Goal: Psychosocial  Outcome: Progressing Towards Goal  Goal: *Hemodynamically stable  Outcome: Progressing Towards Goal  Goal: *Neurologically stable  Description: Absence of additional neurological deficits    Outcome: Progressing Towards Goal  Goal: *Verbalizes anxiety and depression are reduced or absent  Outcome: Progressing Towards Goal  Goal: *Absence of Signs of Aspiration on Current Diet  Outcome: Progressing Towards Goal  Goal: *Absence of deep venous thrombosis signs and symptoms(Stroke Metric)  Outcome: Progressing Towards Goal  Goal: *Ability to perform ADLs and demonstrates progressive mobility and function  Outcome: Progressing Towards Goal  Goal: *Stroke education started(Stroke Metric)  Outcome: Progressing Towards Goal  Goal: *Dysphagia screen performed(Stroke Metric)  Outcome: Progressing Towards Goal  Goal: *Rehab consulted(Stroke Metric)  Outcome: Progressing Towards Goal     Problem: TIA/CVA Stroke: Day 2 Until Discharge  Goal: Off Pathway (Use only if patient is Off Pathway)  Outcome: Progressing Towards Goal  Goal: Activity/Safety  Outcome: Progressing Towards Goal  Goal: Diagnostic Test/Procedures  Outcome: Progressing Towards Goal  Goal: Nutrition/Diet  Outcome: Progressing Towards Goal  Goal: Discharge Planning  Outcome: Progressing Towards Goal  Goal: Medications  Outcome: Progressing Towards Goal  Goal: Respiratory  Outcome: Progressing Towards Goal  Goal: Treatments/Interventions/Procedures  Outcome: Progressing Towards Goal  Goal: Psychosocial  Outcome: Progressing Towards Goal  Goal: *Verbalizes anxiety and depression are reduced or absent  Outcome: Progressing Towards Goal  Goal: *Absence of aspiration  Outcome: Progressing Towards Goal  Goal: *Absence of deep venous thrombosis signs and symptoms(Stroke Metric)  Outcome: Progressing Towards Goal  Goal: *Optimal pain control at patient's stated goal  Outcome: Progressing Towards Goal  Goal: *Tolerating diet  Outcome: Progressing Towards Goal  Goal: *Ability to perform ADLs and demonstrates progressive mobility and function  Outcome: Progressing Towards Goal  Goal: *Stroke education continued(Stroke Metric)  Outcome: Progressing Towards Goal     Problem: Ischemic Stroke: Discharge Outcomes  Goal: *Verbalizes anxiety and depression are reduced or absent  Outcome: Progressing Towards Goal  Goal: *Verbalize understanding of risk factor modification(Stroke Metric)  Outcome: Progressing Towards Goal  Goal: *Hemodynamically stable  Outcome: Progressing Towards Goal  Goal: *Absence of aspiration pneumonia  Outcome: Progressing Towards Goal  Goal: *Aware of needed dietary changes  Outcome: Progressing Towards Goal  Goal: *Verbalize understanding of prescribed medications including anti-coagulants, anti-lipid, and/or anti-platelets(Stroke Metric)  Outcome: Progressing Towards Goal  Goal: *Tolerating diet  Outcome: Progressing Towards Goal  Goal: *Aware of follow-up diagnostics related to anticoagulants  Outcome: Progressing Towards Goal  Goal: *Ability to perform ADLs and demonstrates progressive mobility and function  Outcome: Progressing Towards Goal  Goal: *Absence of DVT(Stroke Metric)  Outcome: Progressing Towards Goal  Goal: *Absence of aspiration  Outcome: Progressing Towards Goal  Goal: *Optimal pain control at patient's stated goal  Outcome: Progressing Towards Goal  Goal: *Home safety concerns addressed  Outcome: Progressing Towards Goal  Goal: *Describes available resources and support systems  Outcome: Progressing Towards Goal  Goal: *Verbalizes understanding of activation of EMS(911) for stroke symptoms(Stroke Metric)  Outcome: Progressing Towards Goal  Goal: *Understands and describes signs and symptoms to report to providers(Stroke Metric)  Outcome: Progressing Towards Goal  Goal: *Neurolgocially stable (absence of additional neurological deficits)  Outcome: Progressing Towards Goal  Goal: *Verbalizes importance of follow-up with primary care physician(Stroke Metric)  Outcome: Progressing Towards Goal  Goal: *Smoking cessation discussed,if applicable(Stroke Metric)  Outcome: Progressing Towards Goal  Goal: *Depression screening completed(Stroke Metric)  Outcome: Progressing Towards Goal

## 2021-09-01 NOTE — PROGRESS NOTES
Pt is to be discharged home today. Discharge instructions have been reviewed thoroughly and time has been allotted for questions and patient teaching. Pt has no questions at this time. PIV and telemetry have been removed from patient and all personal belongings have been removed from patient room.  Pt is being transported home via private vehicle -- NIH : 0

## 2021-09-01 NOTE — DISCHARGE SUMMARY
34 y/o male admitted for acute dizziness. No other neurologicl deficits or complaints. He was given TPA in the ED after CT head negative for possible stroke. He was then sent to the ICU for further observation. He remained neuroligically without any sx or deficits and hemodynamics remained stable. He had follow up MRI last night which showed no evidence of acute pathology. Neurology has followed the patient and recommended outpatient follow up with ENT for his chronic tinnitus. Patient clinically doing well this morning. Atorvastatin given and added to outpatient medications. Clinical education performed by nursing. Since MRI was negative and patient asymptomatic ok to discharge home from the ICU. Appreciate assistance from social work for setting up outpatient appointments and nursing staff doing discharge education. Plan now is for:  1) Aspirin and statin for stroke prevention  2) F/U with ENT for his tinnitus. 3) Outpatient follow up with neurology  4) Outpatient follow up for routine care and cardiovascular/stroke prevention. 5) Discharge home.

## 2021-09-01 NOTE — PROGRESS NOTES
Chief Complaint: dizziness    Dizziness improve. Feels light headed. Discussed results of MRI and lab resulots and the importance of treating hyperlipidemia. Discussed follow up with ENT>     Assesment and Plan    1. BPV  Status post TPA. MRI pending. Normal CT perfusion. Imaging discussed as well as differential diagnosis. 2.  Dizziness  Start meclizine    3. Tinnitus  Chronic may be familial                                     4, Hyperlipidemia  Continue atorvastatim           Allergies  Patient has no known allergies. Medications  Current Facility-Administered Medications   Medication Dose Route Frequency    atorvastatin (LIPITOR) tablet 40 mg  40 mg Oral QHS    aspirin chewable tablet 81 mg  81 mg Oral DAILY    meclizine (ANTIVERT) tablet 25 mg  25 mg Oral Q6H PRN    sodium chloride (NS) flush 5-40 mL  5-40 mL IntraVENous Q8H    sodium chloride (NS) flush 5-40 mL  5-40 mL IntraVENous PRN    acetaminophen (TYLENOL) tablet 650 mg  650 mg Oral Q6H PRN    Or    acetaminophen (TYLENOL) suppository 650 mg  650 mg Rectal Q6H PRN    polyethylene glycol (MIRALAX) packet 17 g  17 g Oral DAILY PRN    ondansetron (ZOFRAN ODT) tablet 4 mg  4 mg Oral Q8H PRN    Or    ondansetron (ZOFRAN) injection 4 mg  4 mg IntraVENous Q6H PRN    niCARdipine (CARDENE) 25 mg in 0.9% sodium chloride 250 mL infusion  0-15 mg/hr IntraVENous TITRATE    glucose chewable tablet 16 g  4 Tablet Oral PRN    dextrose (D50W) injection syrg 12.5-25 g  12.5-25 g IntraVENous PRN    glucagon (GLUCAGEN) injection 1 mg  1 mg IntraMUSCular PRN    pantoprazole (PROTONIX) tablet 40 mg  40 mg Oral ACB        Medical History  Past Medical History:   Diagnosis Date    GERD (gastroesophageal reflux disease)     Hypertension        Review of Systems   Constitutional: Negative for chills and fever. HENT: Positive for tinnitus. Negative for ear pain. Eyes: Negative for pain and discharge.    Respiratory: Negative for cough and hemoptysis. Cardiovascular: Negative for chest pain and claudication. Gastrointestinal: Negative for constipation and diarrhea. Genitourinary: Negative for flank pain and hematuria. Musculoskeletal: Negative for back pain and myalgias. Skin: Negative for itching and rash. Neurological: Positive for dizziness and headaches. Endo/Heme/Allergies: Negative for environmental allergies. Does not bruise/bleed easily. Psychiatric/Behavioral: Negative for depression and hallucinations. Exam:    Visit Vitals  BP (!) 133/95 (BP 1 Location: Right upper arm, BP Patient Position: At rest)   Pulse 74   Temp 98.5 °F (36.9 °C)   Resp 16   Ht 5' 6\" (1.676 m)   Wt 250 lb (113.4 kg)   SpO2 96%   BMI 40.35 kg/m²        General:  Laying in bed anxious   Head: Normocephalic, atraumatic, anicteric sclera   Neck Normal ROM,  no thyromegally   Lungs:  Clear to auscultation bilaterally, No wheezes or rubs   Cardiac: Regular rate and rhythm with no murmurs. Abd: Bowel sounds were audible. Ext: No pedal edema   Skin: Supple no rash     NeurologicExam:  Mental Status:  Awake alert oriented to person place and time   Speech: Fluent no aphasia or dysarthria. Cranial Nerves:  opens eyes spontaneously  does respond to sound  no dolls eyes  PERRLA  corneal reflex intact  symmetric grimace  does have a gag reflex    Motor:  Full and symmetric strength of upper and lower extremities. Normal bulk and tone. Reflexes:   Deep tendon reflexes 1+/4 and symmetric. Sensory:   Symmetric and intact with no perceived deficits    Tremor:   No tremor noted. Cerebellar:  Coordination intact. Neurovascular: No carotid bruits.  No JVD         Imaging    CT Results (most recent):  Results from Hospital Encounter encounter on 08/30/21    CT CODE NEURO PERF W CBF    Narrative  PRELIMINARY REPORT:    A perfusion abnormality is not demonstrated    Preliminary report was provided by Dr. Guillermo Reaves, the on-call radiologist, at 8194    Final report to follow. CLINICAL HISTORY: Code stroke. Vomiting. Dizziness. EXAMINATION:  CT ANGIOGRAPHY HEAD AND NECK, CT PERFUSION    COMPARISON: None    TECHNIQUE:  Following the uneventful administration of iodinated contrast  material, axial CT angiography of the head and neck was performed. Delayed axial  images through the head were also obtained. Coronal and sagittal reconstructions  were obtained. Manual postprocessing of images was performed. 3-D  Sagittal  maximal intensity projection images were obtained. 3-D Coronal maximal  intensity projections were obtained. CT brain perfusion was performed with  generation of hemodynamic maps of multiple parameters, including cerebral blood  flow, cerebral blood volume, mean transit time, and TMAX. CT dose reduction was  achieved through use of a standardized protocol tailored for this examination  and automatic exposure control for dose modulation. This study was analyzed by  the [address] ai algorithm. FINDINGS:    Delayed contrast-enhanced head CT:    The ventricles are midline without hydrocephalus. There is no acute intra or  extra-axial hemorrhage. The basal cisterns are clear. The paranasal sinuses are  clear. CTA NECK:    Great vessels: Normal arch anatomy with the origins patent. Right subclavian artery: Patent    Left subclavian artery: Patent    Right common carotid artery: Patent    Left common carotid artery: Patent    Cervical right internal carotid artery: Patent with no significant stenosis by  NASCET criteria. Cervical left internal carotid artery: Patent with no significant stenosis by  NASCET criteria. Right vertebral artery: Patent    Left vertebral artery: Patent    The lung apices are clear. The thyroid is homogeneous. No cervical  lymphadenopathy.     CTA HEAD:    Right cavernous internal carotid artery: Patent    Left cavernous internal carotid artery: Patent    Anterior cerebral arteries: Patent    Anterior communicating artery: Patent    Right middle cerebral artery: Patent    Left middle cerebral artery: Patent    Posterior communicating arteries: Patent    Posterior cerebral arteries: Patent    Basilar artery: Patent. Fenestrated. Distal vertebral arteries: Patent    No evidence for intracranial aneurysm or hemodynamically significant stenosis. CT Perfusion:  Normal CT perfusion. Impression  No large vessel occlusion, hemodynamically significant carotid stenosis, or  perfusion abnormality. MRI Results (most recent):  Results from [place] encounter on 08/30/21    MRI BRAIN WO CONT    Narrative  EXAM: MRI BRAIN WO CONT    INDICATION: stroke    COMPARISON: CTA 8/30/2021. CONTRAST: None. TECHNIQUE:  Multiplanar multisequence acquisition without contrast of the brain. FINDINGS:  The ventricles are normal in size and position. There is no acute infarct,  hemorrhage, extra-axial fluid collection, or mass effect. There is no cerebellar  tonsillar herniation. Expected arterial flow-voids are present. The paranasal sinuses, mastoid air cells, and middle ears are clear. The orbital  contents are within normal limits. No significant osseous or scalp lesions are  identified. Impression  Unremarkable MRI of the brain. No acute process.       .  Lab Review    Recent Results (from the past 24 hour(s))   ECHO ADULT COMPLETE    Collection Time: 08/31/21  4:17 PM   Result Value Ref Range    IVSd 1.29 (A) 0.60 - 1.00 cm    LVIDd 4.40 4.20 - 5.90 cm    LVIDs 2.88 cm    LVOT d 2.27 cm    LVPWd 1.30 (A) 0.60 - 1.00 cm    LVOT Peak Gradient 2.65 mmHg    LVOT Peak Velocity 81.36 cm/s    Left Atrium Major Axis 3.74 cm    Aortic Valve Area by Continuity of Peak Velocity 3.03 cm2    AoV PG 4.86 mmHg    Aortic Valve Systolic Mean Gradient 5.24 mmHg    Aortic Valve Systolic Peak Velocity 458.97 cm/s    AoV VTI 18.03 cm    MV A Rahul 51.07 cm/s    Mitral Valve E Wave Deceleration Time 137.54 ms    MV E Rahul 66.19 cm/s    E/E' ratio (averaged) 10.78     E/E' lateral 6.92     E/E' septal 14.64     LV E' Lateral Velocity 9.56 cm/s    LV E' Septal Velocity 4.52 cm/s    MV Peak Gradient 3.11 mmHg    MV Mean Gradient 1.32 mmHg    Mitral Valve Pressure Half-time 43.72 ms    Mitral Valve Max Velocity 88.15 cm/s    Mitral Valve Annulus Velocity Time Integral 18.30 cm    MVA (PHT) 5.03 cm2    Pulmonic Valve Systolic Peak Instantaneous Gradient 4.73 mmHg    Pulmonic Valve Max Velocity 108.76 cm/s    Ao Root D 3.41 cm    MV E/A 1.30     LV Mass .9 88.0 - 224.0 g    LV Mass AL Index 97.2 49.0 - 115.0 g/m2    Left Atrium Minor Axis 1.70 cm    CORINA/BSA Pk Rahul 1.4 cm2/m2   CBC W/O DIFF    Collection Time: 09/01/21  5:00 AM   Result Value Ref Range    WBC 7.7 4.1 - 11.1 K/uL    RBC 4.68 4.10 - 5.70 M/uL    HGB 14.8 12.1 - 17.0 g/dL    HCT 44.3 36.6 - 50.3 %    MCV 94.7 80.0 - 99.0 FL    MCH 31.6 26.0 - 34.0 PG    MCHC 33.4 30.0 - 36.5 g/dL    RDW 13.4 11.5 - 14.5 %    PLATELET 794 943 - 431 K/uL    MPV 10.3 8.9 - 12.9 FL    NRBC 0.0 0  WBC    ABSOLUTE NRBC 0.00 0.00 - 5.55 K/uL   METABOLIC PANEL, BASIC    Collection Time: 09/01/21  5:00 AM   Result Value Ref Range    Sodium 139 136 - 145 mmol/L    Potassium 3.8 3.5 - 5.1 mmol/L    Chloride 105 97 - 108 mmol/L    CO2 28 21 - 32 mmol/L    Anion gap 6 5 - 15 mmol/L    Glucose 107 (H) 65 - 100 mg/dL    BUN 13 6 - 20 MG/DL    Creatinine 0.98 0.70 - 1.30 MG/DL    BUN/Creatinine ratio 13 12 - 20      GFR est AA >60 >60 ml/min/1.73m2    GFR est non-AA >60 >60 ml/min/1.73m2    Calcium 9.2 8.5 - 10.1 MG/DL   MAGNESIUM    Collection Time: 09/01/21  5:00 AM   Result Value Ref Range    Magnesium 2.3 1.6 - 2.4 mg/dL   PHOSPHORUS    Collection Time: 09/01/21  5:00 AM   Result Value Ref Range    Phosphorus 3.5 2.6 - 4.7 MG/DL

## 2021-09-01 NOTE — PROGRESS NOTES
Transition of Care Plan:    RUR:  8%  Disposition:  Home w/ spouse and O/P f/u  Follow up appointments:  New PCP; Pulmonary/ENT  DME needed:  none  Transportation at Discharge: spouse  [address] or means to access home:    spouse    IM Medicare Letter:  n/a  Is patient a BCPI-A Bundle:  n/a         If yes, was Bundle Letter given?:     Caregiver Contact:  Rebekah RUBALCAVA-AHHT-[phone]  Discharge Caregiver contacted prior to discharge? Yes    Reason for Admission:  Pt was admitted from home for evaluation of sudden vertigo - r/o stroke. Pt was given TPA and has Transfer orders to Neuro. RUR Score: 8%                   Plan for utilizing home health:      Not needed    PCP: First and Last name:  Nino Qureshi MD     Name of Practice:    Are you a current patient: Yes/No:    Approximate date of last visit:    Can you participate in a virtual visit with your PCP:    Pt would like to change to new PCP at [place] [id number]                 Current Advanced Directive/Advance Care Plan: Full Code      Healthcare Decision Maker:   Click here to complete [address] including selection of the Healthcare Decision Maker Relationship (ie \"Primary\")             Primary Decision MakerDarlyn Nic - Spouse - [phone]                  Transition of Care Plan:     Pt lives w/ his wife and is indep for mobility and ADLs to include employment and driving. Stroke ruled out per Neuro and pt is stable for dc home w/ spouse today. Will need multiple f/u appmts as indicated on AVS.  CM spoke w/ pt about new PCP appmt - if they do not wish to risk being out of network with insurance, pt will re-schedule appointment with existing PCP, Dr. Josue Estevze. No other dc needs identified. Care Management Interventions  PCP Verified by CM: Yes  Palliative Care Criteria Met (RRAT>21 & CHF Dx)?: No  Mode of Transport at Discharge:  Other (see comment)  Transition of Care Consult (CM Consult): Discharge Planning  Discharge Durable Medical Equipment: No  Physical Therapy Consult: Yes  Occupational Therapy Consult: Yes  Speech Therapy Consult: Yes  Current Support Network: Lives with Spouse  Confirm Follow Up Transport: Self  Discharge Location  Discharge Placement: Home with outpatient services     KELLIE Winters

## 2021-09-01 NOTE — INTERDISCIPLINARY ROUNDS
Interdisciplinary team rounds were held 9/1/2021 with the following team members:Care Management, Diabetes Treatment Specialist, Nursing, Nutrition, Pharmacy, Physical Therapy, Physician, Respiratory Therapy and Clinical Coordinator. Plan of care discussed. See clinical pathway and/or care plan for interventions and desired outcomes.

## 2021-09-07 ENCOUNTER — OFFICE VISIT (OUTPATIENT)
Dept: FAMILY MEDICINE CLINIC | Age: 41
End: 2021-09-07
Payer: COMMERCIAL

## 2021-09-07 VITALS
DIASTOLIC BLOOD PRESSURE: 101 MMHG | RESPIRATION RATE: 16 BRPM | TEMPERATURE: 97.3 F | HEART RATE: 109 BPM | SYSTOLIC BLOOD PRESSURE: 136 MMHG | OXYGEN SATURATION: 95 % | HEIGHT: 66 IN | BODY MASS INDEX: 41.11 KG/M2 | WEIGHT: 255.8 LBS

## 2021-09-07 DIAGNOSIS — R73.03 PREDIABETES: ICD-10-CM

## 2021-09-07 DIAGNOSIS — E78.00 PURE HYPERCHOLESTEROLEMIA: Primary | ICD-10-CM

## 2021-09-07 DIAGNOSIS — E78.2 MIXED HYPERLIPIDEMIA: ICD-10-CM

## 2021-09-07 DIAGNOSIS — R42 VERTIGO: ICD-10-CM

## 2021-09-07 PROCEDURE — 99214 OFFICE O/P EST MOD 30 MIN: CPT | Performed by: FAMILY MEDICINE

## 2021-09-07 NOTE — PROGRESS NOTES
HPI  Zoraida Lyons is a 35 y.o. male who presents to follow-up on hospital stay 8/309/1. Presented with dizziness. Underwent stroke protocol and was given TPA in the emergency room. Was sent to the ICU for observation. Remained neurologically without symptoms or deficits. Had a follow-up MRI done that showed no acute pathology. Neurology was following while inpatient and recommended outpatient follow-up with ENT for his chronic tinnitus. He was discharged on atorvastatin, aspirin    Admission labs showed , A1c 5.8%    PMHx:  Past Medical History:   Diagnosis Date    GERD (gastroesophageal reflux disease)     Hypertension        Meds:   Current Outpatient Medications   Medication Sig Dispense Refill    multivit-min/folic/vit K/lycop (MEN'S MULTIVITAMIN PO) Take  by mouth daily.  aspirin 81 mg chewable tablet Take 1 Tablet by mouth daily. 30 Tablet 1    pantoprazole (PROTONIX) 40 mg tablet Take 1 Tablet by mouth Daily (before breakfast). 30 Tablet 1    atorvastatin (LIPITOR) 40 mg tablet Take 1 Tablet by mouth nightly. 30 Tablet 1       Allergies:   No Known Allergies    Smoker:  Social History     Tobacco Use   Smoking Status Never Smoker   Smokeless Tobacco Never Used       ETOH:   Social History     Substance and Sexual Activity   Alcohol Use Not Currently       FH: History reviewed. No pertinent family history. ROS:   As listed in HPI. In addition:  Constitutional:   No headache, fever, fatigue, weight loss or weight gain      Cardiac:    No chest pain      Resp:   No cough or shortness of breath      Neuro   No loss of consciousness, dizziness, seizures      Physical Exam:  Blood pressure (!) 136/101, pulse (!) 109, temperature 97.3 °F (36.3 °C), temperature source Temporal, resp. rate 16, height 5' 6\" (1.676 m), weight 255 lb 12.8 oz (116 kg), SpO2 95 %. GEN: No apparent distress. Alert and oriented and responds to all questions appropriately.    NEUROLOGIC:  No focal neurologic deficits. Strength and sensation grossly intact. Coordination and gait grossly intact. EXT: Well perfused. No edema. SKIN: No obvious rashes. Lungs clear to auscultation bilaterally  CV regular rate rhythm no murmur       Assessment and Plan     Vertigo  Neurologist felt this was BPV. Referred him to ear nose and throat. Hyperlipidemia  LDL greater than 189  Atorvastatin     elevated BP  Patient has been dehydrating himself because he felt like he would go to run to the bathroom. He is feeling lightheaded with standing. His pulse is high. He is not orthostatic. I encouraged him to rehydrate and if his blood pressure remains high when he sees ENT next week return and we will consider medication      ICD-10-CM ICD-9-CM    1. Pure hypercholesterolemia  E78.00 272.0    2. Prediabetes  R73.03 790.29    3. Vertigo  R42 780.4    4. Mixed hyperlipidemia  E78.2 272.2        AVS given.  Pt expressed understanding of instructions

## 2021-09-07 NOTE — PROGRESS NOTES
[address]    1. Have you been to the ER, urgent care clinic since your last visit? Hospitalized since your last visit? Yes. [address]    2. Have you seen or consulted any other health care providers outside of the [address] [place] [address] since your last visit? Include any pap smears or colon screening.  No    Health Maintenance Due   Topic Date Due    Hepatitis C Screening  Never done    COVID-19 Vaccine (1) Never done    Flu Vaccine (1) Never done

## 2021-09-13 ENCOUNTER — TELEPHONE (OUTPATIENT)
Dept: NEUROLOGY | Age: 41
End: 2021-09-13

## 2021-09-14 ENCOUNTER — TELEPHONE (OUTPATIENT)
Dept: FAMILY MEDICINE CLINIC | Age: 41
End: 2021-09-14

## 2021-09-14 RX ORDER — PREDNISONE 20 MG/1
40 TABLET ORAL
Qty: 14 TABLET | Refills: 0 | Status: SHIPPED | OUTPATIENT
Start: 2021-09-14 | End: 2021-09-21

## 2021-09-14 NOTE — TELEPHONE ENCOUNTER
Patient reported to me as though by the way at his visit last week that he was getting over a gout flare in his left foot. He had he was okay for a few days and then on Thursday last week felt like he was having beginnings of a gout flare in his right foot. Description of the phone suggests lisfranc area. Red, swollen. Improved over a few days and then on Saturday got worse and is now to the point where he is walking with crutches. This is reminiscent of previous flares that he identifies as gout. Then yesterday his left knee flared up. This is not a joint he has ever had gout in. Appears to have some kind of inflammatory arthritis. Does have a known history of gout  We will call in prednisone for relief    Ordered 7 day course of prednisone. Asked him to make an appointment for 10-day follow-up. That way if joints are bothering him again a few days after coming off the prednisone we can take a look. Next step would be to consider medication side effect:    He does have a new medication in the atorvastatin and the Protonix. Neither of these carry gout as a side effect.       Arthralgia is possible with both medications    Protonix has cutaneous SLE as a rare side effect

## 2021-09-23 ENCOUNTER — OFFICE VISIT (OUTPATIENT)
Dept: FAMILY MEDICINE CLINIC | Age: 41
End: 2021-09-23
Payer: COMMERCIAL

## 2021-09-23 VITALS
SYSTOLIC BLOOD PRESSURE: 135 MMHG | TEMPERATURE: 98.4 F | DIASTOLIC BLOOD PRESSURE: 98 MMHG | BODY MASS INDEX: 40.56 KG/M2 | OXYGEN SATURATION: 97 % | HEART RATE: 117 BPM | HEIGHT: 66 IN | WEIGHT: 252.4 LBS | RESPIRATION RATE: 16 BRPM

## 2021-09-23 DIAGNOSIS — M79.605 PAIN IN BOTH LOWER EXTREMITIES: ICD-10-CM

## 2021-09-23 DIAGNOSIS — E78.00 PURE HYPERCHOLESTEROLEMIA: Primary | ICD-10-CM

## 2021-09-23 DIAGNOSIS — R73.03 PREDIABETES: ICD-10-CM

## 2021-09-23 DIAGNOSIS — M79.604 PAIN IN BOTH LOWER EXTREMITIES: ICD-10-CM

## 2021-09-23 PROCEDURE — 99214 OFFICE O/P EST MOD 30 MIN: CPT | Performed by: FAMILY MEDICINE

## 2021-09-23 NOTE — PROGRESS NOTES
1. Have you been to the ER, urgent care clinic since your last visit? Hospitalized since your last visit? No    2. Have you seen or consulted any other health care providers outside of the [address] [place] [address] since your last visit? Include any pap smears or colon screening.  Yes, ENT 09/16/2021    Health Maintenance Due   Topic Date Due    Hepatitis C Screening  Never done    COVID-19 Vaccine (1) Never done    Flu Vaccine (1) Never done     Chief Complaint   Patient presents with    Medication Evaluation     RX Lipitor

## 2021-09-23 NOTE — PROGRESS NOTES
Chief Complaint   Patient presents with    Medication Evaluation     RX Lipitor     Pt was in the hospital for A possible stroke, pt did receive TPA. Pt reports that he was told that he may have just had vertigo. Pt was discharged on Lipitor, reports that he has developed severe leg pains. Pt was given 7 days of prednisone which provided some but not complete relief. Pt is still taking medication, along with aspirin. Subjective: (As above and below)     Chief Complaint   Patient presents with    Medication Evaluation     RX Lipitor     he is a 35y.o. year old male who presents for evaluation. Reviewed PmHx, RxHx, FmHx, SocHx, AllgHx and updated in chart. Review of Systems - negative except as listed above    Objective:     Vitals:    09/23/21 0808 09/23/21 0831   BP: (!) 141/101 (!) 135/98   Pulse: (!) 117    Resp: 16    Temp: 98.4 °F (36.9 °C)    TempSrc: Oral    SpO2: 97%    Weight: 252 lb 6.4 oz (114.5 kg)    Height: 5' 6\" (1.676 m)      Physical Examination: General appearance - alert, well appearing, and in no distress  Mental status - normal mood, behavior, speech, dress, motor activity, and thought processes  Chest - clear to auscultation, no wheezes, rales or rhonchi, symmetric air entry  Heart - normal rate, regular rhythm, normal S1, S2, no murmurs, rubs, clicks or gallops  Musculoskeletal - no joint tenderness, deformity or swelling  Extremities - peripheral pulses normal, no pedal edema, no clubbing or cyanosis    Assessment/ Plan:   1. Pure hypercholesterolemia  -stop lipitor x 5 days, check labs  -will consider alternate medication if symptoms improve  -if no change then will resume statin  - HEPATITIS C AB; Future    2. Pain in both lower extremities  -check labs  - METABOLIC PANEL, COMPREHENSIVE; Future  - CBC WITH AUTOMATED DIFF; Future  - URIC ACID; Future    3.  Prediabetes  -continue to work on diet and exercise     Pt has received COVID vaccine, will bring card to office. I have discussed the diagnosis with the patient and the intended plan as seen in the above orders. The patient has received an after-visit summary and questions were answered concerning future plans.      Medication Side Effects and Warnings were discussed with patient: yes  Patient Labs were reviewed: yes  Patient Past Records were reviewed:  yes    Ana Nyhan, M.D.

## 2021-09-24 LAB
ALBUMIN SERPL-MCNC: 3.8 G/DL (ref 3.5–5)
ALBUMIN/GLOB SERPL: 0.9 {RATIO} (ref 1.1–2.2)
ALP SERPL-CCNC: 95 U/L (ref 45–117)
ALT SERPL-CCNC: 61 U/L (ref 12–78)
ANION GAP SERPL CALC-SCNC: 5 MMOL/L (ref 5–15)
AST SERPL-CCNC: 27 U/L (ref 15–37)
BASOPHILS # BLD: 0.1 K/UL (ref 0–0.1)
BASOPHILS NFR BLD: 1 % (ref 0–1)
BILIRUB SERPL-MCNC: 0.5 MG/DL (ref 0.2–1)
BUN SERPL-MCNC: 17 MG/DL (ref 6–20)
BUN/CREAT SERPL: 15 (ref 12–20)
CALCIUM SERPL-MCNC: 9.7 MG/DL (ref 8.5–10.1)
CHLORIDE SERPL-SCNC: 102 MMOL/L (ref 97–108)
CO2 SERPL-SCNC: 30 MMOL/L (ref 21–32)
CREAT SERPL-MCNC: 1.13 MG/DL (ref 0.7–1.3)
DIFFERENTIAL METHOD BLD: ABNORMAL
EOSINOPHIL # BLD: 0.2 K/UL (ref 0–0.4)
EOSINOPHIL NFR BLD: 2 % (ref 0–7)
ERYTHROCYTE [DISTWIDTH] IN BLOOD BY AUTOMATED COUNT: 12.9 % (ref 11.5–14.5)
GLOBULIN SER CALC-MCNC: 4.3 G/DL (ref 2–4)
GLUCOSE SERPL-MCNC: 92 MG/DL (ref 65–100)
HCT VFR BLD AUTO: 51 % (ref 36.6–50.3)
HCV AB SERPL QL IA: NONREACTIVE
HGB BLD-MCNC: 16.1 G/DL (ref 12.1–17)
IMM GRANULOCYTES # BLD AUTO: 0.1 K/UL (ref 0–0.04)
IMM GRANULOCYTES NFR BLD AUTO: 1 % (ref 0–0.5)
LYMPHOCYTES # BLD: 3.7 K/UL (ref 0.8–3.5)
LYMPHOCYTES NFR BLD: 31 % (ref 12–49)
MCH RBC QN AUTO: 30.8 PG (ref 26–34)
MCHC RBC AUTO-ENTMCNC: 31.6 G/DL (ref 30–36.5)
MCV RBC AUTO: 97.5 FL (ref 80–99)
MONOCYTES # BLD: 0.8 K/UL (ref 0–1)
MONOCYTES NFR BLD: 7 % (ref 5–13)
NEUTS SEG # BLD: 7 K/UL (ref 1.8–8)
NEUTS SEG NFR BLD: 58 % (ref 32–75)
NRBC # BLD: 0 K/UL (ref 0–0.01)
NRBC BLD-RTO: 0 PER 100 WBC
PLATELET # BLD AUTO: 396 K/UL (ref 150–400)
PMV BLD AUTO: 10 FL (ref 8.9–12.9)
POTASSIUM SERPL-SCNC: 4.5 MMOL/L (ref 3.5–5.1)
PROT SERPL-MCNC: 8.1 G/DL (ref 6.4–8.2)
RBC # BLD AUTO: 5.23 M/UL (ref 4.1–5.7)
SODIUM SERPL-SCNC: 137 MMOL/L (ref 136–145)
URATE SERPL-MCNC: 7.9 MG/DL (ref 3.5–7.2)
WBC # BLD AUTO: 11.9 K/UL (ref 4.1–11.1)

## 2021-10-04 ENCOUNTER — TELEPHONE (OUTPATIENT)
Dept: FAMILY MEDICINE CLINIC | Age: 41
End: 2021-10-04

## 2021-10-07 NOTE — TELEPHONE ENCOUNTER
Pt is calling again for Dr Juarez in to med states he needs to talk to her about med that was suppose to be called in and other med states he has been waiting for a call since the Monday

## 2021-10-11 RX ORDER — ALLOPURINOL 100 MG/1
100 TABLET ORAL DAILY
Qty: 90 TABLET | Refills: 1 | Status: SHIPPED | OUTPATIENT
Start: 2021-10-11 | End: 2022-04-06

## 2021-10-11 NOTE — TELEPHONE ENCOUNTER
Called patient back. Patient agrees to starting on allopurinol as noted in result notes. Patient did not notice a significant improvement since stopping Lipitor, does however report that he has noticed a slight gradual improvement each day and leg pain. Advised patient to resume Lipitor, if pain increases then we will reevaluate.   Patient agrees and will  allopurinol at the end of the week when he returns to town from a current trip

## 2022-03-18 PROBLEM — R73.03 PREDIABETES: Status: ACTIVE | Noted: 2017-05-02

## 2022-03-18 PROBLEM — E78.00 PURE HYPERCHOLESTEROLEMIA: Status: ACTIVE | Noted: 2017-05-02

## 2022-03-19 PROBLEM — E78.2 MIXED HYPERLIPIDEMIA: Status: ACTIVE | Noted: 2021-09-07

## 2022-03-19 PROBLEM — I63.9 STROKE (HCC): Status: ACTIVE | Noted: 2021-08-30

## 2022-03-20 PROBLEM — K21.00 GERD WITH ESOPHAGITIS: Status: ACTIVE | Noted: 2017-05-01

## 2022-04-06 RX ORDER — ALLOPURINOL 100 MG/1
TABLET ORAL
Qty: 90 TABLET | Refills: 1 | Status: SHIPPED | OUTPATIENT
Start: 2022-04-06 | End: 2022-10-04

## 2022-11-06 RX ORDER — ALLOPURINOL 100 MG/1
TABLET ORAL
Qty: 30 TABLET | Refills: 0 | Status: SHIPPED | OUTPATIENT
Start: 2022-11-06

## 2022-11-17 RX ORDER — ATORVASTATIN CALCIUM 40 MG/1
TABLET, FILM COATED ORAL
Qty: 90 TABLET | Refills: 0 | Status: SHIPPED | OUTPATIENT
Start: 2022-11-17

## 2022-11-17 NOTE — TELEPHONE ENCOUNTER
Unable to reach patient in regards to medication refill and follow up appointment needed soon. Unable to leave voicemail (full).

## 2022-12-11 RX ORDER — ALLOPURINOL 100 MG/1
TABLET ORAL
Qty: 30 TABLET | Refills: 0 | Status: SHIPPED | OUTPATIENT
Start: 2022-12-11

## 2022-12-28 RX ORDER — ALLOPURINOL 100 MG/1
100 TABLET ORAL DAILY
Qty: 30 TABLET | Refills: 0 | Status: SHIPPED | OUTPATIENT
Start: 2022-12-28

## 2022-12-28 NOTE — TELEPHONE ENCOUNTER
Refill Request ([facility] sent a fax)   -Pharmacy is requesting 90 day supply     Requested Prescriptions     Pending Prescriptions Disp Refills    allopurinoL (ZYLOPRIM) 100 mg tablet 30 Tablet 0     Sig: Take 1 Tablet by mouth daily.        Thank You

## 2023-01-09 RX ORDER — PANTOPRAZOLE SODIUM 40 MG/1
TABLET, DELAYED RELEASE ORAL
Qty: 90 TABLET | Refills: 0 | Status: SHIPPED | OUTPATIENT
Start: 2023-01-09

## 2023-01-09 NOTE — TELEPHONE ENCOUNTER
Unable to reach pt in regards to medication refill and follow up appt needed soon. Left voicemail for pt to call back.

## 2023-03-07 RX ORDER — ATORVASTATIN CALCIUM 40 MG/1
40 TABLET, FILM COATED ORAL
Qty: 30 TABLET | Refills: 0 | Status: SHIPPED | OUTPATIENT
Start: 2023-03-07

## 2023-03-07 RX ORDER — ALLOPURINOL 100 MG/1
100 TABLET ORAL DAILY
Qty: 30 TABLET | Refills: 0 | Status: SHIPPED | OUTPATIENT
Start: 2023-03-07

## 2023-03-07 NOTE — TELEPHONE ENCOUNTER
Refill request (pt calling)    Pt will call back next week when he returns home. Pt was informed that he would need an apt to get a full script    Requested Prescriptions     Pending Prescriptions Disp Refills    allopurinoL (ZYLOPRIM) 100 mg tablet 30 Tablet 0     Sig: Take 1 Tablet by mouth daily. atorvastatin (LIPITOR) 40 mg tablet 90 Tablet 0     Sig: Take 1 Tablet by mouth nightly.

## 2023-04-10 RX ORDER — ALLOPURINOL 100 MG/1
100 TABLET ORAL DAILY
Qty: 30 TABLET | Refills: 0 | Status: CANCELLED | OUTPATIENT
Start: 2023-04-10

## 2023-04-24 ENCOUNTER — OFFICE VISIT (OUTPATIENT)
Dept: FAMILY MEDICINE CLINIC | Age: 43
End: 2023-04-24
Payer: COMMERCIAL

## 2023-04-24 VITALS
TEMPERATURE: 97.7 F | DIASTOLIC BLOOD PRESSURE: 109 MMHG | HEART RATE: 102 BPM | HEIGHT: 66 IN | RESPIRATION RATE: 20 BRPM | BODY MASS INDEX: 43.94 KG/M2 | WEIGHT: 273.4 LBS | OXYGEN SATURATION: 95 % | SYSTOLIC BLOOD PRESSURE: 153 MMHG

## 2023-04-24 DIAGNOSIS — Z00.00 ROUTINE GENERAL MEDICAL EXAMINATION AT A HEALTH CARE FACILITY: Primary | ICD-10-CM

## 2023-04-24 DIAGNOSIS — E78.00 PURE HYPERCHOLESTEROLEMIA: ICD-10-CM

## 2023-04-24 DIAGNOSIS — M10.9 GOUT, UNSPECIFIED CAUSE, UNSPECIFIED CHRONICITY, UNSPECIFIED SITE: ICD-10-CM

## 2023-04-24 DIAGNOSIS — R73.03 PREDIABETES: ICD-10-CM

## 2023-04-24 DIAGNOSIS — E78.2 MIXED HYPERLIPIDEMIA: ICD-10-CM

## 2023-04-24 DIAGNOSIS — E53.8 B12 DEFICIENCY: ICD-10-CM

## 2023-04-24 DIAGNOSIS — I10 ESSENTIAL HYPERTENSION: ICD-10-CM

## 2023-04-24 DIAGNOSIS — G62.9 NEUROPATHY: ICD-10-CM

## 2023-04-24 PROCEDURE — 3080F DIAST BP >= 90 MM HG: CPT | Performed by: FAMILY MEDICINE

## 2023-04-24 PROCEDURE — 99396 PREV VISIT EST AGE 40-64: CPT | Performed by: FAMILY MEDICINE

## 2023-04-24 PROCEDURE — 3077F SYST BP >= 140 MM HG: CPT | Performed by: FAMILY MEDICINE

## 2023-04-24 NOTE — PROGRESS NOTES
Health Maintenance Due   Topic Date Due    COVID-19 Vaccine (1) Never done    A1C test (Diabetic or Prediabetic)  08/31/2022    Lipid Screen  08/31/2022    Depression Screen  09/23/2022   3. For patients aged 39-70: Has the patient had a colonoscopy / FIT/ Cologuard? NA - based on age      If the patient is female:    4. For patients aged 41-77: Has the patient had a mammogram within the past 2 years? NA - based on age or sex      11. For patients aged 21-65: Has the patient had a pap smear?  NA - based on age or sex

## 2023-04-24 NOTE — PROGRESS NOTES
HPI  Flora De is a 38 y.o. male who presents to follow-up on chronic medical issues. 1.5 year since seen    Blood pressure quite elevated today, did not come down on recheck. Has a cuff at home has not been using it. He is unaware of how long blood pressure could have been high. Today is a sense of burning and tingling on the bottoms of his feet about 3 nights per week. Last about an hour. This can happen when he is lying in bed but usually happens in a quiet moment before that. This has been going on for well over a year since he was started on cholesterol medicine following hospital stay September 2021 for possible TIA  . PMHx:  Past Medical History:   Diagnosis Date    GERD (gastroesophageal reflux disease)     Hypertension        Meds:   Current Outpatient Medications   Medication Sig Dispense Refill    allopurinoL (ZYLOPRIM) 100 mg tablet Take 1 Tablet by mouth daily. 30 Tablet 0    atorvastatin (LIPITOR) 40 mg tablet Take 1 Tablet by mouth nightly. 30 Tablet 0    pantoprazole (PROTONIX) 40 mg tablet Take 1 Tablet by mouth Daily (before breakfast). 30 Tablet 0    multivit-min/folic/vit K/lycop (MEN'S MULTIVITAMIN PO) Take  by mouth daily. aspirin 81 mg chewable tablet Take 1 Tablet by mouth daily. 30 Tablet 1       Allergies:   No Known Allergies    Smoker:  Social History     Tobacco Use   Smoking Status Never   Smokeless Tobacco Never       ETOH:   Social History     Substance and Sexual Activity   Alcohol Use Not Currently       FH: History reviewed. No pertinent family history. ROS:   As listed in HPI. In addition:  Constitutional:   No headache, fever, fatigue, weight loss or weight gain      Cardiac:    No chest pain      Resp:   No cough or shortness of breath      Neuro   No loss of consciousness, dizziness, seizures      Physical Exam:  Blood pressure (!) 161/112, pulse (!) 102, temperature 97.7 °F (36.5 °C), temperature source Temporal, resp.  rate 20, height 5' 6\" (1.676 m), weight 273 lb 6.4 oz (124 kg), SpO2 95 %. GEN: No apparent distress. Alert and oriented and responds to all questions appropriately. NEUROLOGIC:  No focal neurologic deficits. Strength and sensation grossly intact. Coordination and gait grossly intact. EXT: Well perfused. No edema. SKIN: No obvious rashes. Lungs clear to auscultation bilaterally  CV regular rate rhythm no murmur  HEENT greater than membrane clear nasal mucosa Mallampati 4 airway       Assessment and Plan     Hypertension  Elevated, did not come down on recheck  Start HCTZ 12.5  Check blood pressure daily for the next week and send me a message with daily readings though specimen  Goal blood pressure less than 140/90. Neuropathy? Temporary problem 1 hour 3 nights per week  Check B12 and sugar  It is a significant enough/uncomfortable no problem that he will take a pill for this. Gabapentin can be considered based on blood work results    Hyperlipidemia  LDL greater than 200  Atorvastatin        ICD-10-CM ICD-9-CM    1. Routine general medical examination at a health care facility  Z00.00 V70.0 HEMOGLOBIN A1C WITH EAG      URIC ACID      LIPID PANEL      CBC WITH AUTOMATED DIFF      METABOLIC PANEL, COMPREHENSIVE      2. Pure hypercholesterolemia  E78.00 272.0 LIPID PANEL      CBC WITH AUTOMATED DIFF      METABOLIC PANEL, COMPREHENSIVE      3. Prediabetes  R73.03 790.29 HEMOGLOBIN A1C WITH EAG      4. Mixed hyperlipidemia  E78.2 272.2       5. Gout, unspecified cause, unspecified chronicity, unspecified site  M10.9 274.9 URIC ACID      6. B12 deficiency  E53.8 266.2 VITAMIN B12 & FOLATE      7. Neuropathy  G62.9 355.9           AVS given.  Pt expressed understanding of instructions

## 2023-04-25 LAB
ALBUMIN SERPL-MCNC: 3.9 G/DL (ref 3.5–5)
ALBUMIN/GLOB SERPL: 1 (ref 1.1–2.2)
ALP SERPL-CCNC: 100 U/L (ref 45–117)
ALT SERPL-CCNC: 80 U/L (ref 12–78)
ANION GAP SERPL CALC-SCNC: 6 MMOL/L (ref 5–15)
AST SERPL-CCNC: 58 U/L (ref 15–37)
BASOPHILS # BLD: 0.1 K/UL (ref 0–0.1)
BASOPHILS NFR BLD: 1 % (ref 0–1)
BILIRUB SERPL-MCNC: 0.4 MG/DL (ref 0.2–1)
BUN SERPL-MCNC: 13 MG/DL (ref 6–20)
BUN/CREAT SERPL: 12 (ref 12–20)
CALCIUM SERPL-MCNC: 9.5 MG/DL (ref 8.5–10.1)
CHLORIDE SERPL-SCNC: 108 MMOL/L (ref 97–108)
CHOLEST SERPL-MCNC: 201 MG/DL
CO2 SERPL-SCNC: 26 MMOL/L (ref 21–32)
CREAT SERPL-MCNC: 1.12 MG/DL (ref 0.7–1.3)
DIFFERENTIAL METHOD BLD: NORMAL
EOSINOPHIL # BLD: 0.2 K/UL (ref 0–0.4)
EOSINOPHIL NFR BLD: 3 % (ref 0–7)
ERYTHROCYTE [DISTWIDTH] IN BLOOD BY AUTOMATED COUNT: 13 % (ref 11.5–14.5)
EST. AVERAGE GLUCOSE BLD GHB EST-MCNC: 126 MG/DL
FOLATE SERPL-MCNC: 22 NG/ML (ref 5–21)
GLOBULIN SER CALC-MCNC: 4.1 G/DL (ref 2–4)
GLUCOSE SERPL-MCNC: 133 MG/DL (ref 65–100)
HBA1C MFR BLD: 6 % (ref 4–5.6)
HCT VFR BLD AUTO: 49.2 % (ref 36.6–50.3)
HDLC SERPL-MCNC: 50 MG/DL
HDLC SERPL: 4 (ref 0–5)
HGB BLD-MCNC: 15.7 G/DL (ref 12.1–17)
IMM GRANULOCYTES # BLD AUTO: 0 K/UL (ref 0–0.04)
IMM GRANULOCYTES NFR BLD AUTO: 0 % (ref 0–0.5)
LDLC SERPL CALC-MCNC: 117.8 MG/DL (ref 0–100)
LYMPHOCYTES # BLD: 2.9 K/UL (ref 0.8–3.5)
LYMPHOCYTES NFR BLD: 34 % (ref 12–49)
MCH RBC QN AUTO: 30.5 PG (ref 26–34)
MCHC RBC AUTO-ENTMCNC: 31.9 G/DL (ref 30–36.5)
MCV RBC AUTO: 95.5 FL (ref 80–99)
MONOCYTES # BLD: 0.7 K/UL (ref 0–1)
MONOCYTES NFR BLD: 8 % (ref 5–13)
NEUTS SEG # BLD: 4.8 K/UL (ref 1.8–8)
NEUTS SEG NFR BLD: 54 % (ref 32–75)
NRBC # BLD: 0 K/UL (ref 0–0.01)
NRBC BLD-RTO: 0 PER 100 WBC
PLATELET # BLD AUTO: 274 K/UL (ref 150–400)
PMV BLD AUTO: 11.1 FL (ref 8.9–12.9)
POTASSIUM SERPL-SCNC: 3.8 MMOL/L (ref 3.5–5.1)
PROT SERPL-MCNC: 8 G/DL (ref 6.4–8.2)
RBC # BLD AUTO: 5.15 M/UL (ref 4.1–5.7)
SODIUM SERPL-SCNC: 140 MMOL/L (ref 136–145)
TRIGL SERPL-MCNC: 166 MG/DL (ref ?–150)
URATE SERPL-MCNC: 6.6 MG/DL (ref 3.5–7.2)
VIT B12 SERPL-MCNC: 773 PG/ML (ref 193–986)
VLDLC SERPL CALC-MCNC: 33.2 MG/DL
WBC # BLD AUTO: 8.7 K/UL (ref 4.1–11.1)

## 2023-04-26 ENCOUNTER — TELEPHONE (OUTPATIENT)
Dept: FAMILY MEDICINE CLINIC | Age: 43
End: 2023-04-26

## 2023-04-26 DIAGNOSIS — G62.9 NEUROPATHY: Primary | ICD-10-CM

## 2023-04-26 DIAGNOSIS — G62.9 NEUROPATHY: ICD-10-CM

## 2023-04-26 RX ORDER — HYDROCHLOROTHIAZIDE 12.5 MG/1
12.5 TABLET ORAL DAILY
Qty: 90 TABLET | Refills: 3 | Status: SHIPPED | OUTPATIENT
Start: 2023-04-26

## 2023-04-26 RX ORDER — GABAPENTIN 100 MG/1
100 CAPSULE ORAL
Qty: 30 CAPSULE | Refills: 3 | Status: SHIPPED | OUTPATIENT
Start: 2023-04-26

## 2023-04-26 NOTE — TELEPHONE ENCOUNTER
Pt is calling wanting to know if you were going to call in ref to results and let him know if you were going to put him on med

## 2023-04-26 NOTE — TELEPHONE ENCOUNTER
verified. Informed pt of message from provider regarding labs and medication. PT verified understanding. Pt will also continue taking his BP readings and let us know in a week what they are. Pt had no further questions.

## 2023-04-26 NOTE — TELEPHONE ENCOUNTER
Unable to reach pt in regards to message from provider regarding labs and medication. Left voicemail to call back.

## 2023-05-08 RX ORDER — ATORVASTATIN CALCIUM 40 MG/1
TABLET, FILM COATED ORAL
Qty: 90 TABLET | Refills: 3 | Status: SHIPPED | OUTPATIENT
Start: 2023-05-08

## 2023-05-08 RX ORDER — ALLOPURINOL 100 MG/1
TABLET ORAL
Qty: 90 TABLET | Refills: 3 | Status: SHIPPED | OUTPATIENT
Start: 2023-05-08

## 2023-05-25 DIAGNOSIS — I10 ESSENTIAL (PRIMARY) HYPERTENSION: ICD-10-CM

## 2023-05-25 DIAGNOSIS — G62.9 POLYNEUROPATHY, UNSPECIFIED: Primary | ICD-10-CM

## 2023-05-25 RX ORDER — HYDROCHLOROTHIAZIDE 25 MG/1
25 TABLET ORAL DAILY
Qty: 90 TABLET | Refills: 3 | Status: SHIPPED | OUTPATIENT
Start: 2023-05-25 | End: 2023-05-26 | Stop reason: SDUPTHER

## 2023-05-25 RX ORDER — GABAPENTIN 100 MG/1
CAPSULE ORAL
COMMUNITY
Start: 2023-04-26 | End: 2023-05-26 | Stop reason: SDUPTHER

## 2023-05-25 NOTE — TELEPHONE ENCOUNTER
Pt notified and voiced understanding. Pt states he uses the [facility] in [place] not [facility].  Pt would like [facility] rx send to [facility] and pt also needs a refill on gabapentin

## 2023-05-25 NOTE — TELEPHONE ENCOUNTER
Received a blood pressure log late April and early May. Most systolic blood pressures greater than 610, most diastolic blood pressures greater than 90.     Recommend increasing HCTZ from 12.5 up to 25 (2 pills)    Continue checking blood pressure, update in 1-2 weeks

## 2023-05-26 RX ORDER — HYDROCHLOROTHIAZIDE 25 MG/1
25 TABLET ORAL DAILY
Qty: 90 TABLET | Refills: 3 | Status: SHIPPED | OUTPATIENT
Start: 2023-05-26

## 2023-05-26 RX ORDER — GABAPENTIN 100 MG/1
CAPSULE ORAL
Qty: 90 CAPSULE | Refills: 1 | Status: SHIPPED | OUTPATIENT
Start: 2023-05-26 | End: 2023-10-26

## 2023-05-26 RX ORDER — PANTOPRAZOLE SODIUM 40 MG/1
TABLET, DELAYED RELEASE ORAL
Qty: 90 TABLET | Refills: 3 | Status: SHIPPED | OUTPATIENT
Start: 2023-05-26

## 2024-04-30 RX ORDER — ATORVASTATIN CALCIUM 40 MG/1
TABLET, FILM COATED ORAL
Qty: 90 TABLET | Refills: 3 | Status: SHIPPED | OUTPATIENT
Start: 2024-04-30

## 2024-04-30 RX ORDER — ALLOPURINOL 100 MG/1
TABLET ORAL
Qty: 90 TABLET | Refills: 3 | Status: SHIPPED | OUTPATIENT
Start: 2024-04-30

## 2024-05-14 RX ORDER — PANTOPRAZOLE SODIUM 40 MG/1
TABLET, DELAYED RELEASE ORAL
Qty: 90 TABLET | Refills: 0 | Status: SHIPPED | OUTPATIENT
Start: 2024-05-14

## 2024-08-09 DIAGNOSIS — I10 ESSENTIAL (PRIMARY) HYPERTENSION: ICD-10-CM

## 2024-08-09 RX ORDER — PANTOPRAZOLE SODIUM 40 MG/1
TABLET, DELAYED RELEASE ORAL
Qty: 90 TABLET | Refills: 0 | Status: SHIPPED | OUTPATIENT
Start: 2024-08-09

## 2024-08-09 RX ORDER — HYDROCHLOROTHIAZIDE 25 MG/1
25 TABLET ORAL DAILY
Qty: 90 TABLET | Refills: 0 | Status: SHIPPED | OUTPATIENT
Start: 2024-08-09

## 2024-11-06 DIAGNOSIS — I10 ESSENTIAL (PRIMARY) HYPERTENSION: ICD-10-CM

## 2024-11-06 RX ORDER — PANTOPRAZOLE SODIUM 40 MG/1
TABLET, DELAYED RELEASE ORAL
Qty: 90 TABLET | Refills: 0 | OUTPATIENT
Start: 2024-11-06

## 2024-11-06 RX ORDER — HYDROCHLOROTHIAZIDE 25 MG/1
25 TABLET ORAL DAILY
Qty: 90 TABLET | Refills: 0 | OUTPATIENT
Start: 2024-11-06

## 2025-05-06 RX ORDER — ATORVASTATIN CALCIUM 40 MG/1
40 TABLET, FILM COATED ORAL NIGHTLY
Qty: 90 TABLET | Refills: 3 | OUTPATIENT
Start: 2025-05-06

## 2025-05-06 RX ORDER — ALLOPURINOL 100 MG/1
100 TABLET ORAL DAILY
Qty: 90 TABLET | Refills: 3 | OUTPATIENT
Start: 2025-05-06